# Patient Record
Sex: FEMALE | Race: BLACK OR AFRICAN AMERICAN | NOT HISPANIC OR LATINO | Employment: FULL TIME | URBAN - METROPOLITAN AREA
[De-identification: names, ages, dates, MRNs, and addresses within clinical notes are randomized per-mention and may not be internally consistent; named-entity substitution may affect disease eponyms.]

---

## 2018-08-21 ENCOUNTER — OFFICE VISIT (OUTPATIENT)
Dept: SPINE | Facility: CLINIC | Age: 33
End: 2018-08-21
Payer: COMMERCIAL

## 2018-08-21 VITALS
HEIGHT: 62 IN | SYSTOLIC BLOOD PRESSURE: 135 MMHG | HEART RATE: 88 BPM | DIASTOLIC BLOOD PRESSURE: 80 MMHG | BODY MASS INDEX: 38.17 KG/M2 | WEIGHT: 207.44 LBS

## 2018-08-21 DIAGNOSIS — M54.42 ACUTE LEFT-SIDED LOW BACK PAIN WITH LEFT-SIDED SCIATICA: Primary | ICD-10-CM

## 2018-08-21 PROCEDURE — 3008F BODY MASS INDEX DOCD: CPT | Mod: ,,, | Performed by: PHYSICAL MEDICINE & REHABILITATION

## 2018-08-21 PROCEDURE — 99204 OFFICE O/P NEW MOD 45 MIN: CPT | Mod: ,,, | Performed by: PHYSICAL MEDICINE & REHABILITATION

## 2018-08-21 RX ORDER — ETODOLAC 400 MG/1
400 TABLET, EXTENDED RELEASE ORAL DAILY
Qty: 21 TABLET | Refills: 1 | Status: SHIPPED | OUTPATIENT
Start: 2018-08-21 | End: 2020-06-24

## 2018-08-21 RX ORDER — GABAPENTIN 300 MG/1
300 CAPSULE ORAL NIGHTLY
Qty: 30 CAPSULE | Refills: 1 | Status: SHIPPED | OUTPATIENT
Start: 2018-08-21 | End: 2020-06-24

## 2018-08-21 RX ORDER — NAPROXEN SODIUM 220 MG
220 TABLET ORAL
COMMUNITY
End: 2018-08-21 | Stop reason: ALTCHOICE

## 2018-08-21 RX ORDER — FERROUS GLUCONATE 324(38)MG
324 TABLET ORAL
COMMUNITY

## 2018-08-21 NOTE — PROGRESS NOTES
SUBJECTIVE:    Patient ID: Shalini Stephen is a 32 y.o. female.    Chief Complaint: Back Pain (aching pan in mid and lower back pain moving to left leg )    This is a 32-year-old woman who sees Dr. Watson for her primary care.  She has no chronic major medical problems.  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  She has no history of back problems.  She presents with a 1-1/2 month history of primarily left-sided low back pain at the lumbosacral junction radiating into the left lateral hip and ending in the left posterior calf just below the knee.  She feels like her left knee is getting weak.  She saw Dr. watson who started her on steroids.  She took 3 pills but discontinued them because she said they made her heart race.  Since then she has been taking over-the-counter Aleve and feels that is helping some.  She denies any bowel or bladder dysfunction fever chills sweats or unexpected weight loss.  She has no cancer history.  Current pain level is 4/10 but gets to be 8/10 at times.  Pain has been the index score is about 5.4 overall.          History reviewed. No pertinent past medical history.  Social History     Socioeconomic History    Marital status: Single     Spouse name: Not on file    Number of children: Not on file    Years of education: Not on file    Highest education level: Not on file   Social Needs    Financial resource strain: Not on file    Food insecurity - worry: Not on file    Food insecurity - inability: Not on file    Transportation needs - medical: Not on file    Transportation needs - non-medical: Not on file   Occupational History    Not on file   Tobacco Use    Smoking status: Not on file   Substance and Sexual Activity    Alcohol use: Not on file    Drug use: Not on file    Sexual activity: Not on file   Other Topics Concern    Not on file   Social History Narrative    Not on file     History reviewed. No pertinent surgical history.  History reviewed. No pertinent  "family history.  Vitals:    08/21/18 1459   BP: 135/80   Pulse: 88   Weight: 94.1 kg (207 lb 7.3 oz)   Height: 5' 2" (1.575 m)       Review of Systems   Constitutional: Negative for chills, diaphoresis, fatigue, fever and unexpected weight change.   HENT: Negative for trouble swallowing.    Eyes: Negative for visual disturbance.   Respiratory: Negative for shortness of breath.    Cardiovascular: Negative for chest pain.   Gastrointestinal: Negative for abdominal pain, constipation, nausea and vomiting.   Genitourinary: Negative for difficulty urinating.   Musculoskeletal: Negative for arthralgias, back pain, gait problem, joint swelling, myalgias, neck pain and neck stiffness.   Neurological: Negative for dizziness, speech difficulty, weakness, light-headedness, numbness and headaches.          Objective:      Physical Exam   Constitutional: She is oriented to person, place, and time. She appears well-developed and well-nourished.   Neurological: She is alert and oriented to person, place, and time.   She is awake and in no acute distress  She has no point tenderness or palpable masses about the lumbar spine  Forward flexion is to 90° with no pain  Extension causes mild pain at the lumbosacral junction  Deep tendon reflexes +2 at both knees and +1 at both ankles  Strength is normal in both lower extremities  Straight leg raising reproduces left calf discomfort on the left and is normal in the right  IRAJ testing is negative for reproduction of back pain  X-rays of the lumbar spine show moderate degenerative changes at L4-5 and L5-S1 more so than would be expected for her age           Assessment:       1. Acute left-sided low back pain with left-sided sciatica           Plan:         she has a nonfocal examination from a neurological standpoint and no historical red flags.  She has back pain and leg pain most likely on the basis of degenerative disc disease in the lower lumbar spine.  We will try to treat her " conservatively with physical therapy.  I have discontinued the Aleve in favor of Lodine  mg daily.  Add Neurontin for the left leg pain.  Consider MRI and subsequent epidural steroid injections  Acute left-sided low back pain with left-sided sciatica  -     Ambulatory Referral to Physical/Occupational Therapy  -     etodolac (LODINE XL) 400 MG 24 hr tablet; Take 1 tablet (400 mg total) by mouth once daily.  Dispense: 21 tablet; Refill: 1  -     gabapentin (NEURONTIN) 300 MG capsule; Take 1 capsule (300 mg total) by mouth every evening.  Dispense: 30 capsule; Refill: 1

## 2018-09-10 ENCOUNTER — CLINICAL SUPPORT (OUTPATIENT)
Dept: REHABILITATION | Facility: HOSPITAL | Age: 33
End: 2018-09-10
Attending: PHYSICAL MEDICINE & REHABILITATION
Payer: COMMERCIAL

## 2018-09-10 DIAGNOSIS — M54.42 ACUTE LEFT-SIDED LOW BACK PAIN WITH LEFT-SIDED SCIATICA: Primary | ICD-10-CM

## 2018-09-10 PROBLEM — M54.50 LOW BACK PAIN: Status: ACTIVE | Noted: 2018-09-10

## 2018-09-10 PROCEDURE — 97110 THERAPEUTIC EXERCISES: CPT | Mod: PN

## 2018-09-10 PROCEDURE — 97161 PT EVAL LOW COMPLEX 20 MIN: CPT | Mod: PN

## 2018-09-10 PROCEDURE — 97010 HOT OR COLD PACKS THERAPY: CPT | Mod: PN

## 2018-09-17 ENCOUNTER — CLINICAL SUPPORT (OUTPATIENT)
Dept: REHABILITATION | Facility: HOSPITAL | Age: 33
End: 2018-09-17
Attending: PHYSICAL MEDICINE & REHABILITATION
Payer: COMMERCIAL

## 2018-09-17 DIAGNOSIS — M54.42 ACUTE LEFT-SIDED LOW BACK PAIN WITH LEFT-SIDED SCIATICA: Primary | ICD-10-CM

## 2018-09-17 PROCEDURE — 97010 HOT OR COLD PACKS THERAPY: CPT | Mod: PN

## 2018-09-17 PROCEDURE — 97110 THERAPEUTIC EXERCISES: CPT | Mod: PN

## 2018-09-17 NOTE — PROGRESS NOTES
Name: Shalini Stephen  [facility] Number: [id number]  Date of Treatment: 09/17/2018   Diagnosis:   Encounter Diagnosis   Name Primary?    Acute left-sided low back pain with left-sided sciatica Yes       Time in: 1305  Time Out: 1345  Total Treatment Time: 40  Group Time: 0      Subjective:    Shalini reports improvement of symptoms.  Patient reports their pain to be 4/10 on a 0-10 scale with 0 being no pain and 10 being the worst pain imaginable.    Objective    Patient received individual therapy to increase flexibility and pain-free movements  with activities as follows:     Shalini received therapeutic exercises to develop ROM and flexibility for 25 minutes including: pyriformis stretches, trunk flexion extension in supine and prone, SLR, posterior pelvic tilt, and knee to chest         The patient received the following direct contact modalities after being cleared for contraindications: Moist hot packs    The patient received the following supervised modalities after being cleared for contradictions: intermittent lumbar traction 45% to 25% load @60 sec intervals and hold.    Assessment:       Pt will continue to benefit from skilled PT intervention. Medical Necessity is demonstrated by:  Unable to participate fully in daily activities.    Patient is making good progress towards established goals.          Plan:  Continue with established Plan of Care towards PT goals.

## 2018-10-08 ENCOUNTER — CLINICAL SUPPORT (OUTPATIENT)
Dept: REHABILITATION | Facility: HOSPITAL | Age: 33
End: 2018-10-08
Attending: PHYSICAL MEDICINE & REHABILITATION
Payer: COMMERCIAL

## 2018-10-08 DIAGNOSIS — G89.29 CHRONIC BILATERAL LOW BACK PAIN WITH LEFT-SIDED SCIATICA: ICD-10-CM

## 2018-10-08 DIAGNOSIS — M54.42 CHRONIC BILATERAL LOW BACK PAIN WITH LEFT-SIDED SCIATICA: ICD-10-CM

## 2018-10-08 PROCEDURE — 97110 THERAPEUTIC EXERCISES: CPT | Mod: PN

## 2018-10-08 PROCEDURE — 97012 MECHANICAL TRACTION THERAPY: CPT | Mod: PN

## 2018-10-08 PROCEDURE — 97010 HOT OR COLD PACKS THERAPY: CPT | Mod: PN

## 2018-10-08 NOTE — PROGRESS NOTES
Name: Shalini Stephen  [facility] Number: [id number]  Date of Treatment: 10/08/2018   Diagnosis:   Encounter Diagnosis   Name Primary?    Chronic bilateral low back pain with left-sided sciatica        Time in: 1400  Time Out: 1505  Total Treatment Time: 65  Group Time: 0      Subjective:    Shalini reports decreased pain.  Patient reports their pain to be 4/10 on a 0-10 scale with 0 being no pain and 10 being the worst pain imaginable.    Objective    Patient received individual therapy to increase flexibility and core stabilization with activities as follows:     Shalini received therapeutic exercises to develop flexibility and core stabilization for 30 minutes including:   Hamstring stretch, pyriformis/gluts strtech  Posterior pelvic tilt, briidging  SLR  Trunk rotation  Abdominal sets  Trunk extension  Leg extension in prone    Shalini received the following manual therapy techniques: Manual traction were applied to the: lumbars for 15 minutes intermittent at 90#'s max and 45#'s min force.    The patient received the following supervised modalities after being cleared for contradictions: Moist hot pads to paralumbars x 15 min    Assessment:       Pt  reports referred pain to lower left leg from below the knee .  Tolerate traction and exercises well. No irradiating pain during session. Localized pain to L3 area on trunk extension.    Patient is making good progress towards established goals.    Plan:  Continue with established Plan of Care towards PT goals.

## 2018-11-06 ENCOUNTER — DOCUMENTATION ONLY (OUTPATIENT)
Dept: REHABILITATION | Facility: HOSPITAL | Age: 33
End: 2018-11-06

## 2018-11-06 NOTE — PROGRESS NOTES
REHAB SERVICES OUTPATIENT DISCHARGE SUMMARY  Physical Therapy      Name:  Shalini Stephen  Date:  11/6/18  Date of Evaluation:  9/10/18  Physician:  John Ahuja MD  Total # Of Visits:  3  Cancelled:  4  No Shows:  1  Diagnosis:  Acute left sided LBP with left sided sciatica    Physical/Functional Status:  At time of discharge, patient has localized pain to L3 4/10. Reports of referred pain to left lower leg but no irradiating pain during session.  The patient is to be discharged from our Therapy service for the following reason(s):  Patient has not attended therapy since 10/8/18    Degree of Goal Achievement:  Patient has partially met goals    Unplanned DC

## 2020-06-24 ENCOUNTER — LAB VISIT (OUTPATIENT)
Dept: LAB | Facility: HOSPITAL | Age: 35
End: 2020-06-24
Attending: FAMILY MEDICINE
Payer: COMMERCIAL

## 2020-06-24 DIAGNOSIS — Z00.00 ROUTINE GENERAL MEDICAL EXAMINATION AT A HEALTH CARE FACILITY: Primary | ICD-10-CM

## 2020-06-24 LAB
ALBUMIN SERPL BCP-MCNC: 4 G/DL (ref 3.5–5.2)
ALP SERPL-CCNC: 68 U/L (ref 55–135)
ALT SERPL W/O P-5'-P-CCNC: 17 U/L (ref 10–44)
ANION GAP SERPL CALC-SCNC: 8 MMOL/L (ref 8–16)
AST SERPL-CCNC: 16 U/L (ref 10–40)
BASOPHILS # BLD AUTO: 0.06 K/UL (ref 0–0.2)
BASOPHILS NFR BLD: 0.8 % (ref 0–1.9)
BILIRUB SERPL-MCNC: 0.5 MG/DL (ref 0.1–1)
BUN SERPL-MCNC: 14 MG/DL (ref 6–20)
CALCIUM SERPL-MCNC: 8.7 MG/DL (ref 8.7–10.5)
CHLORIDE SERPL-SCNC: 104 MMOL/L (ref 95–110)
CHOLEST SERPL-MCNC: 193 MG/DL (ref 120–199)
CHOLEST/HDLC SERPL: 3.1 {RATIO} (ref 2–5)
CO2 SERPL-SCNC: 26 MMOL/L (ref 23–29)
CREAT SERPL-MCNC: 0.7 MG/DL (ref 0.5–1.4)
DIFFERENTIAL METHOD: ABNORMAL
EOSINOPHIL # BLD AUTO: 0.2 K/UL (ref 0–0.5)
EOSINOPHIL NFR BLD: 2.5 % (ref 0–8)
ERYTHROCYTE [DISTWIDTH] IN BLOOD BY AUTOMATED COUNT: 13.4 % (ref 11.5–14.5)
EST. GFR  (AFRICAN AMERICAN): >60 ML/MIN/1.73 M^2
EST. GFR  (NON AFRICAN AMERICAN): >60 ML/MIN/1.73 M^2
GLUCOSE SERPL-MCNC: 86 MG/DL (ref 70–110)
HCT VFR BLD AUTO: 38.2 % (ref 37–48.5)
HDLC SERPL-MCNC: 63 MG/DL (ref 40–75)
HDLC SERPL: 32.6 % (ref 20–50)
HGB BLD-MCNC: 12.3 G/DL (ref 12–16)
IMM GRANULOCYTES # BLD AUTO: 0.03 K/UL (ref 0–0.04)
IMM GRANULOCYTES NFR BLD AUTO: 0.4 % (ref 0–0.5)
LDLC SERPL CALC-MCNC: 125.4 MG/DL (ref 63–159)
LYMPHOCYTES # BLD AUTO: 2.5 K/UL (ref 1–4.8)
LYMPHOCYTES NFR BLD: 33.4 % (ref 18–48)
MCH RBC QN AUTO: 28 PG (ref 27–31)
MCHC RBC AUTO-ENTMCNC: 32.2 G/DL (ref 32–36)
MCV RBC AUTO: 87 FL (ref 82–98)
MONOCYTES # BLD AUTO: 0.6 K/UL (ref 0.3–1)
MONOCYTES NFR BLD: 8.6 % (ref 4–15)
NEUTROPHILS # BLD AUTO: 4.1 K/UL (ref 1.8–7.7)
NEUTROPHILS NFR BLD: 54.3 % (ref 38–73)
NONHDLC SERPL-MCNC: 130 MG/DL
NRBC BLD-RTO: 0 /100 WBC
PLATELET # BLD AUTO: 391 K/UL (ref 150–350)
PMV BLD AUTO: 10.1 FL (ref 9.2–12.9)
POTASSIUM SERPL-SCNC: 4 MMOL/L (ref 3.5–5.1)
PROT SERPL-MCNC: 7.4 G/DL (ref 6–8.4)
RBC # BLD AUTO: 4.4 M/UL (ref 4–5.4)
SODIUM SERPL-SCNC: 138 MMOL/L (ref 136–145)
TRIGL SERPL-MCNC: 23 MG/DL (ref 30–150)
TSH SERPL DL<=0.005 MIU/L-ACNC: 1.18 UIU/ML (ref 0.34–5.6)
WBC # BLD AUTO: 7.48 K/UL (ref 3.9–12.7)

## 2020-06-24 PROCEDURE — 84443 ASSAY THYROID STIM HORMONE: CPT

## 2020-06-24 PROCEDURE — 36415 COLL VENOUS BLD VENIPUNCTURE: CPT

## 2020-06-24 PROCEDURE — 80053 COMPREHEN METABOLIC PANEL: CPT

## 2020-06-24 PROCEDURE — 85025 COMPLETE CBC W/AUTO DIFF WBC: CPT

## 2020-06-24 PROCEDURE — 80061 LIPID PANEL: CPT

## 2020-06-26 DIAGNOSIS — R10.11 ABDOMINAL PAIN, RIGHT UPPER QUADRANT: Primary | ICD-10-CM

## 2020-06-28 PROBLEM — K21.9 GASTROESOPHAGEAL REFLUX DISEASE: Status: ACTIVE | Noted: 2020-06-28

## 2020-06-28 PROBLEM — R10.11 RUQ ABDOMINAL PAIN: Status: ACTIVE | Noted: 2020-06-28

## 2020-06-30 ENCOUNTER — HOSPITAL ENCOUNTER (OUTPATIENT)
Dept: RADIOLOGY | Facility: HOSPITAL | Age: 35
Discharge: HOME | End: 2020-06-30
Attending: FAMILY MEDICINE
Payer: COMMERCIAL

## 2020-06-30 DIAGNOSIS — R10.11 ABDOMINAL PAIN, RIGHT UPPER QUADRANT: ICD-10-CM

## 2020-06-30 PROCEDURE — 76700 US EXAM ABDOM COMPLETE: CPT | Mod: TC

## 2020-07-14 ENCOUNTER — LAB VISIT (OUTPATIENT)
Dept: LAB | Facility: HOSPITAL | Age: 35
End: 2020-07-14
Attending: FAMILY MEDICINE
Payer: COMMERCIAL

## 2020-07-14 ENCOUNTER — HOSPITAL ENCOUNTER (OUTPATIENT)
Dept: RADIOLOGY | Facility: HOSPITAL | Age: 35
Discharge: HOME | End: 2020-07-14
Attending: FAMILY MEDICINE
Payer: COMMERCIAL

## 2020-07-14 ENCOUNTER — CLINICAL SUPPORT (OUTPATIENT)
Dept: CARDIOLOGY | Facility: HOSPITAL | Age: 35
End: 2020-07-14
Attending: FAMILY MEDICINE
Payer: COMMERCIAL

## 2020-07-14 DIAGNOSIS — M79.661 BILATERAL CALF PAIN: ICD-10-CM

## 2020-07-14 DIAGNOSIS — M79.661 TENDERNESS OF RIGHT CALF: ICD-10-CM

## 2020-07-14 DIAGNOSIS — M79.661 TENDERNESS OF RIGHT CALF: Primary | ICD-10-CM

## 2020-07-14 DIAGNOSIS — M79.662 BILATERAL CALF PAIN: ICD-10-CM

## 2020-07-14 DIAGNOSIS — R00.2 PALPITATIONS: Primary | ICD-10-CM

## 2020-07-14 LAB
BNP SERPL-MCNC: 51 PG/ML (ref 0–99)
D DIMER PPP IA.FEU-MCNC: 0.53 UG/ML FEU

## 2020-07-14 PROCEDURE — 93971 EXTREMITY STUDY: CPT | Mod: TC,RT

## 2020-07-14 PROCEDURE — 85379 FIBRIN DEGRADATION QUANT: CPT

## 2020-07-14 PROCEDURE — 36415 COLL VENOUS BLD VENIPUNCTURE: CPT

## 2020-07-14 PROCEDURE — 83880 ASSAY OF NATRIURETIC PEPTIDE: CPT

## 2020-07-14 PROCEDURE — 93226 XTRNL ECG REC<48 HR SCAN A/R: CPT

## 2020-07-31 ENCOUNTER — HOSPITAL ENCOUNTER (OUTPATIENT)
Dept: RADIOLOGY | Facility: HOSPITAL | Age: 35
Discharge: HOME | End: 2020-07-31
Attending: FAMILY MEDICINE
Payer: COMMERCIAL

## 2020-07-31 PROCEDURE — 71046 X-RAY EXAM CHEST 2 VIEWS: CPT | Mod: TC

## 2020-08-02 PROBLEM — F41.9 ANXIETY: Status: ACTIVE | Noted: 2020-08-02

## 2020-09-22 ENCOUNTER — OFFICE VISIT (OUTPATIENT)
Dept: FAMILY MEDICINE | Facility: CLINIC | Age: 35
End: 2020-09-22
Payer: COMMERCIAL

## 2020-09-22 VITALS
OXYGEN SATURATION: 99 % | HEIGHT: 64 IN | SYSTOLIC BLOOD PRESSURE: 120 MMHG | HEART RATE: 80 BPM | BODY MASS INDEX: 37.39 KG/M2 | DIASTOLIC BLOOD PRESSURE: 76 MMHG | TEMPERATURE: 99 F | WEIGHT: 219 LBS

## 2020-09-22 DIAGNOSIS — R14.0 ABDOMINAL BLOATING: Primary | ICD-10-CM

## 2020-09-22 PROCEDURE — 99213 PR OFFICE/OUTPT VISIT, EST, LEVL III, 20-29 MIN: ICD-10-PCS | Mod: S$GLB,,, | Performed by: FAMILY MEDICINE

## 2020-09-22 PROCEDURE — 3008F BODY MASS INDEX DOCD: CPT | Mod: CPTII,S$GLB,, | Performed by: FAMILY MEDICINE

## 2020-09-22 PROCEDURE — 99213 OFFICE O/P EST LOW 20 MIN: CPT | Mod: S$GLB,,, | Performed by: FAMILY MEDICINE

## 2020-09-22 PROCEDURE — 3008F PR BODY MASS INDEX (BMI) DOCUMENTED: ICD-10-PCS | Mod: CPTII,S$GLB,, | Performed by: FAMILY MEDICINE

## 2020-09-23 ENCOUNTER — LAB VISIT (OUTPATIENT)
Dept: LAB | Facility: HOSPITAL | Age: 35
End: 2020-09-23
Attending: FAMILY MEDICINE
Payer: COMMERCIAL

## 2020-09-23 DIAGNOSIS — R14.0 GASTRIC TYMPANY: Primary | ICD-10-CM

## 2020-09-23 PROCEDURE — 87338 HPYLORI STOOL AG IA: CPT

## 2020-09-23 NOTE — PROGRESS NOTES
Here with her .  He is positive for H problem arises starting therapy.  She had some acid reflux symptoms to Protonix for 90 days.  It is doing better.  She is a nonsmoker.  Feeling a little abdominal bloating.  Physical examination vital signs are noted.  Chest clear to auscultation heart rhythm murmur gallop or abdomen sounds are positive soft is a very slight upper quadrant sensitivity.    Impression abdominal bloating.  Gastroesophageal reflux disease.bmi 37    Plan H pylori stool antigen.  Diet weight reduction.

## 2020-09-30 ENCOUNTER — PATIENT MESSAGE (OUTPATIENT)
Dept: FAMILY MEDICINE | Facility: CLINIC | Age: 35
End: 2020-09-30

## 2020-10-01 LAB — H PYLORI AG STL QL IA: NEGATIVE

## 2020-11-12 ENCOUNTER — PATIENT OUTREACH (OUTPATIENT)
Dept: ADMINISTRATIVE | Facility: HOSPITAL | Age: 35
End: 2020-11-12

## 2020-11-13 NOTE — PROGRESS NOTES
Immunizations reviewed. [facility] reviewed. Care Everywhere reviewed.  [facility], [facility], and [facility] reviewed w/ no applicable results yielded.  Chart review completed.               JEFFREY

## 2021-01-04 ENCOUNTER — PATIENT MESSAGE (OUTPATIENT)
Dept: ADMINISTRATIVE | Facility: HOSPITAL | Age: 36
End: 2021-01-04

## 2021-04-05 ENCOUNTER — PATIENT MESSAGE (OUTPATIENT)
Dept: ADMINISTRATIVE | Facility: HOSPITAL | Age: 36
End: 2021-04-05

## 2021-07-07 ENCOUNTER — PATIENT MESSAGE (OUTPATIENT)
Dept: ADMINISTRATIVE | Facility: HOSPITAL | Age: 36
End: 2021-07-07

## 2021-09-20 ENCOUNTER — PATIENT OUTREACH (OUTPATIENT)
Dept: ADMINISTRATIVE | Facility: HOSPITAL | Age: 36
End: 2021-09-20

## 2021-10-05 ENCOUNTER — PATIENT MESSAGE (OUTPATIENT)
Dept: ADMINISTRATIVE | Facility: HOSPITAL | Age: 36
End: 2021-10-05

## 2022-01-03 ENCOUNTER — OFFICE VISIT (OUTPATIENT)
Dept: FAMILY MEDICINE | Facility: CLINIC | Age: 37
End: 2022-01-03
Payer: COMMERCIAL

## 2022-01-03 ENCOUNTER — LAB VISIT (OUTPATIENT)
Dept: LAB | Facility: HOSPITAL | Age: 37
End: 2022-01-03
Attending: FAMILY MEDICINE
Payer: COMMERCIAL

## 2022-01-03 VITALS
DIASTOLIC BLOOD PRESSURE: 71 MMHG | SYSTOLIC BLOOD PRESSURE: 117 MMHG | OXYGEN SATURATION: 100 % | BODY MASS INDEX: 36.19 KG/M2 | HEIGHT: 64 IN | HEART RATE: 74 BPM | WEIGHT: 212 LBS | TEMPERATURE: 98 F

## 2022-01-03 DIAGNOSIS — F41.9 ANXIETY: ICD-10-CM

## 2022-01-03 DIAGNOSIS — M54.2 NECK PAIN ON RIGHT SIDE: ICD-10-CM

## 2022-01-03 DIAGNOSIS — G43.909 MIGRAINE WITHOUT STATUS MIGRAINOSUS, NOT INTRACTABLE, UNSPECIFIED MIGRAINE TYPE: ICD-10-CM

## 2022-01-03 DIAGNOSIS — R22.1 NECK SWELLING: ICD-10-CM

## 2022-01-03 DIAGNOSIS — G43.909 MIGRAINE WITHOUT STATUS MIGRAINOSUS, NOT INTRACTABLE, UNSPECIFIED MIGRAINE TYPE: Primary | ICD-10-CM

## 2022-01-03 LAB
ALBUMIN SERPL BCP-MCNC: 3.9 G/DL (ref 3.5–5.2)
ALP SERPL-CCNC: 60 U/L (ref 55–135)
ALT SERPL W/O P-5'-P-CCNC: 21 U/L (ref 10–44)
ANION GAP SERPL CALC-SCNC: 10 MMOL/L (ref 8–16)
AST SERPL-CCNC: 17 U/L (ref 10–40)
BASOPHILS # BLD AUTO: 0.04 K/UL (ref 0–0.2)
BASOPHILS NFR BLD: 0.4 % (ref 0–1.9)
BILIRUB SERPL-MCNC: 0.8 MG/DL (ref 0.1–1)
BUN SERPL-MCNC: 9 MG/DL (ref 6–20)
CALCIUM SERPL-MCNC: 8.5 MG/DL (ref 8.7–10.5)
CHLORIDE SERPL-SCNC: 103 MMOL/L (ref 95–110)
CO2 SERPL-SCNC: 24 MMOL/L (ref 23–29)
CREAT SERPL-MCNC: 0.6 MG/DL (ref 0.5–1.4)
CRP SERPL-MCNC: 0.26 MG/DL
DIFFERENTIAL METHOD: ABNORMAL
EOSINOPHIL # BLD AUTO: 0.2 K/UL (ref 0–0.5)
EOSINOPHIL NFR BLD: 1.7 % (ref 0–8)
ERYTHROCYTE [DISTWIDTH] IN BLOOD BY AUTOMATED COUNT: 14.7 % (ref 11.5–14.5)
ERYTHROCYTE [SEDIMENTATION RATE] IN BLOOD BY WESTERGREN METHOD: 5 MM/HR (ref 0–20)
EST. GFR  (AFRICAN AMERICAN): >60 ML/MIN/1.73 M^2
EST. GFR  (NON AFRICAN AMERICAN): >60 ML/MIN/1.73 M^2
GLUCOSE SERPL-MCNC: 84 MG/DL (ref 70–110)
HCT VFR BLD AUTO: 36.8 % (ref 37–48.5)
HGB BLD-MCNC: 12 G/DL (ref 12–16)
IMM GRANULOCYTES # BLD AUTO: 0.02 K/UL (ref 0–0.04)
IMM GRANULOCYTES NFR BLD AUTO: 0.2 % (ref 0–0.5)
LYMPHOCYTES # BLD AUTO: 2.3 K/UL (ref 1–4.8)
LYMPHOCYTES NFR BLD: 25.4 % (ref 18–48)
MCH RBC QN AUTO: 27.5 PG (ref 27–31)
MCHC RBC AUTO-ENTMCNC: 32.6 G/DL (ref 32–36)
MCV RBC AUTO: 84 FL (ref 82–98)
MONOCYTES # BLD AUTO: 0.6 K/UL (ref 0.3–1)
MONOCYTES NFR BLD: 6.9 % (ref 4–15)
NEUTROPHILS # BLD AUTO: 5.9 K/UL (ref 1.8–7.7)
NEUTROPHILS NFR BLD: 65.4 % (ref 38–73)
NRBC BLD-RTO: 0 /100 WBC
PLATELET # BLD AUTO: 360 K/UL (ref 150–450)
PMV BLD AUTO: 10 FL (ref 9.2–12.9)
POTASSIUM SERPL-SCNC: 3.8 MMOL/L (ref 3.5–5.1)
PROT SERPL-MCNC: 7.3 G/DL (ref 6–8.4)
RBC # BLD AUTO: 4.37 M/UL (ref 4–5.4)
SODIUM SERPL-SCNC: 137 MMOL/L (ref 136–145)
TSH SERPL DL<=0.005 MIU/L-ACNC: 1.44 UIU/ML (ref 0.34–5.6)
WBC # BLD AUTO: 9.01 K/UL (ref 3.9–12.7)

## 2022-01-03 PROCEDURE — 3074F PR MOST RECENT SYSTOLIC BLOOD PRESSURE < 130 MM HG: ICD-10-PCS | Mod: CPTII,S$GLB,, | Performed by: FAMILY MEDICINE

## 2022-01-03 PROCEDURE — 84443 ASSAY THYROID STIM HORMONE: CPT | Performed by: FAMILY MEDICINE

## 2022-01-03 PROCEDURE — 85025 COMPLETE CBC W/AUTO DIFF WBC: CPT | Performed by: FAMILY MEDICINE

## 2022-01-03 PROCEDURE — 3074F SYST BP LT 130 MM HG: CPT | Mod: CPTII,S$GLB,, | Performed by: FAMILY MEDICINE

## 2022-01-03 PROCEDURE — 99214 PR OFFICE/OUTPT VISIT, EST, LEVL IV, 30-39 MIN: ICD-10-PCS | Mod: S$GLB,,, | Performed by: FAMILY MEDICINE

## 2022-01-03 PROCEDURE — 3008F PR BODY MASS INDEX (BMI) DOCUMENTED: ICD-10-PCS | Mod: CPTII,S$GLB,, | Performed by: FAMILY MEDICINE

## 2022-01-03 PROCEDURE — 3008F BODY MASS INDEX DOCD: CPT | Mod: CPTII,S$GLB,, | Performed by: FAMILY MEDICINE

## 2022-01-03 PROCEDURE — 3078F DIAST BP <80 MM HG: CPT | Mod: CPTII,S$GLB,, | Performed by: FAMILY MEDICINE

## 2022-01-03 PROCEDURE — 85651 RBC SED RATE NONAUTOMATED: CPT | Performed by: FAMILY MEDICINE

## 2022-01-03 PROCEDURE — 1159F MED LIST DOCD IN RCRD: CPT | Mod: CPTII,S$GLB,, | Performed by: FAMILY MEDICINE

## 2022-01-03 PROCEDURE — 1159F PR MEDICATION LIST DOCUMENTED IN MEDICAL RECORD: ICD-10-PCS | Mod: CPTII,S$GLB,, | Performed by: FAMILY MEDICINE

## 2022-01-03 PROCEDURE — 86140 C-REACTIVE PROTEIN: CPT | Performed by: FAMILY MEDICINE

## 2022-01-03 PROCEDURE — 86038 ANTINUCLEAR ANTIBODIES: CPT | Performed by: FAMILY MEDICINE

## 2022-01-03 PROCEDURE — 36415 COLL VENOUS BLD VENIPUNCTURE: CPT | Performed by: FAMILY MEDICINE

## 2022-01-03 PROCEDURE — 80053 COMPREHEN METABOLIC PANEL: CPT | Performed by: FAMILY MEDICINE

## 2022-01-03 PROCEDURE — 99214 OFFICE O/P EST MOD 30 MIN: CPT | Mod: S$GLB,,, | Performed by: FAMILY MEDICINE

## 2022-01-03 PROCEDURE — 3078F PR MOST RECENT DIASTOLIC BLOOD PRESSURE < 80 MM HG: ICD-10-PCS | Mod: CPTII,S$GLB,, | Performed by: FAMILY MEDICINE

## 2022-01-03 RX ORDER — SUMATRIPTAN SUCCINATE 100 MG/1
100 TABLET ORAL
COMMUNITY
End: 2022-01-03 | Stop reason: SDUPTHER

## 2022-01-03 RX ORDER — ALPRAZOLAM 0.5 MG/1
0.5 TABLET ORAL DAILY PRN
Qty: 30 TABLET | Refills: 0 | Status: SHIPPED | OUTPATIENT
Start: 2022-01-03 | End: 2023-05-22 | Stop reason: SDUPTHER

## 2022-01-03 RX ORDER — SUMATRIPTAN SUCCINATE 100 MG/1
TABLET ORAL
Qty: 9 TABLET | Refills: 3 | Status: SHIPPED | OUTPATIENT
Start: 2022-01-03 | End: 2023-05-22 | Stop reason: SDUPTHER

## 2022-01-04 LAB — ANA TITR SER IF: NEGATIVE {TITER}

## 2022-01-04 NOTE — PROGRESS NOTES
Migraine headaches.  Right temporal and parietal.  No nausea or vomiting.  No scotoma.  Gets 1 about every 3 days for month or so.  Over-the-counter Excedrin.  They were only with her cycle previously.  No hormone changes.  Similar headaches just change in frequency.  Also some right neck pain.  Swelling she thinks at the lower neck.  Movement is okay.  Family history of migraines and aunt and mother.  Neck is just uncomfortable not actually tender.  That is been going on for few months.  She feels vein may BM little enlarged in the right lateral lower neck.  Also anxiety issues.  Needs refill of Xanax.    Physical examination vital signs noted.  Pupils are equal round regular active light accommodation temple nontender.  Parietal scalp nontender.  Extraocular muscles are intact.  Cranial nerves intact.  Romberg negative.  Deep tender reflexes 2+ upper and lower extremities.  Neck is supple good range of motion without pain.  I really do not feel any fullness or pain or see any abnormality in the right lower neck.  Chest clear to auscultation.  Heart regular rate rhythm.  Extremities without edema.    Impression.  Migraine headaches.  Right neck pain.  Anxiety.    Plan Xanax 0.5 number 30 pills 1 daily maximum p.r.n..  X-ray cervical spine.  Will ultrasound the neck soft tissue see if we see anything there.  Sed rate CRP YOSI TSH CBC CMP ordered.  Imitrex 100 mg 9. With 2 refills.  One at onset of by Yuly headache and q.day maximum.  Further workup including MRI depending on response to medication and results.

## 2022-01-07 ENCOUNTER — TELEPHONE (OUTPATIENT)
Dept: FAMILY MEDICINE | Facility: CLINIC | Age: 37
End: 2022-01-07
Payer: COMMERCIAL

## 2022-01-07 NOTE — TELEPHONE ENCOUNTER
----- Message from Pernell Schneider sent at 1/7/2022 10:23 AM CST -----  Contact: pt  Type: Needs Medical Advice    Who Called:pt  Best Call Back Number:[phone]    Additional Information: Requesting callback regarding missed call please call back     Please Advise-Thank you

## 2022-01-08 PROBLEM — G43.909 MIGRAINE WITHOUT STATUS MIGRAINOSUS, NOT INTRACTABLE: Status: ACTIVE | Noted: 2022-01-08

## 2022-01-08 PROBLEM — R10.11 RUQ ABDOMINAL PAIN: Status: RESOLVED | Noted: 2020-06-28 | Resolved: 2022-01-08

## 2022-01-08 PROBLEM — M54.42 ACUTE LEFT-SIDED LOW BACK PAIN WITH LEFT-SIDED SCIATICA: Status: RESOLVED | Noted: 2018-08-21 | Resolved: 2022-01-08

## 2022-01-10 NOTE — TELEPHONE ENCOUNTER
Left message for patient that someone likely called with lab results.  Per MD: Lab is normal.  Let us know how the medication works.  NORMAL followup as needed.

## 2022-02-28 ENCOUNTER — HOSPITAL ENCOUNTER (OUTPATIENT)
Dept: RADIOLOGY | Facility: HOSPITAL | Age: 37
Discharge: HOME | End: 2022-02-28
Attending: FAMILY MEDICINE
Payer: COMMERCIAL

## 2022-02-28 DIAGNOSIS — M54.2 NECK PAIN ON RIGHT SIDE: ICD-10-CM

## 2022-02-28 DIAGNOSIS — R22.1 NECK SWELLING: ICD-10-CM

## 2022-02-28 PROCEDURE — 76536 US EXAM OF HEAD AND NECK: CPT | Mod: TC

## 2022-02-28 PROCEDURE — 72040 X-RAY EXAM NECK SPINE 2-3 VW: CPT | Mod: 26,,, | Performed by: RADIOLOGY

## 2022-02-28 PROCEDURE — 72040 X-RAY EXAM NECK SPINE 2-3 VW: CPT | Mod: TC,FY

## 2022-02-28 PROCEDURE — 76536 US EXAM OF HEAD AND NECK: CPT | Mod: 26,,, | Performed by: RADIOLOGY

## 2022-02-28 PROCEDURE — 76536 US SOFT TISSUE HEAD NECK THYROID: ICD-10-PCS | Mod: 26,,, | Performed by: RADIOLOGY

## 2022-02-28 PROCEDURE — 72040 XR CERVICAL SPINE 2 OR 3 VIEWS: ICD-10-PCS | Mod: 26,,, | Performed by: RADIOLOGY

## 2022-03-28 ENCOUNTER — TELEPHONE (OUTPATIENT)
Dept: FAMILY MEDICINE | Facility: CLINIC | Age: 37
End: 2022-03-28
Payer: COMMERCIAL

## 2022-03-28 DIAGNOSIS — Z12.31 SCREENING MAMMOGRAM FOR BREAST CANCER: Primary | ICD-10-CM

## 2022-03-28 NOTE — TELEPHONE ENCOUNTER
----- Message from Morgan Collins sent at 3/28/2022  1:50 PM CDT -----  Contact: Self  Type:  Mammogram    Caller is requesting to schedule their annual mammogram appointment.  Order is not listed in EPIC.  Please enter order and contact patient to schedule.    Name of Caller:  Patient  Where would they like the mammogram performed?  [facility]  Best Call Back Number:  [phone]   Additional Information:

## 2022-03-30 ENCOUNTER — PATIENT OUTREACH (OUTPATIENT)
Dept: ADMINISTRATIVE | Facility: OTHER | Age: 37
End: 2022-03-30
Payer: COMMERCIAL

## 2022-03-30 NOTE — PROGRESS NOTES
Health Maintenance Due   Topic Date Due    Hepatitis C Screening  Never done    Cervical Cancer Screening  Never done    HIV Screening  Never done    TETANUS VACCINE  Never done    Influenza Vaccine (1) 09/01/2021    COVID-19 Vaccine (2 - Booster for Ilsa series) 01/01/2022     Updates were requested from care everywhere.  Chart was reviewed for overdue Proactive [facility] Encounters (SENDY) topics (CRS, Breast Cancer Screening, Eye exam)  Health Maintenance has been updated.  LINKS immunization registry triggered.  Immunizations were reconciled.

## 2022-04-01 ENCOUNTER — HOSPITAL ENCOUNTER (OUTPATIENT)
Dept: RADIOLOGY | Facility: CLINIC | Age: 37
Discharge: HOME | End: 2022-04-01
Attending: FAMILY MEDICINE
Payer: COMMERCIAL

## 2022-04-01 VITALS — HEIGHT: 64 IN | WEIGHT: 212.06 LBS | BODY MASS INDEX: 36.2 KG/M2

## 2022-04-01 DIAGNOSIS — Z12.31 SCREENING MAMMOGRAM FOR BREAST CANCER: ICD-10-CM

## 2022-04-01 PROCEDURE — 77063 BREAST TOMOSYNTHESIS BI: CPT | Mod: 26,,, | Performed by: RADIOLOGY

## 2022-04-01 PROCEDURE — 77067 SCR MAMMO BI INCL CAD: CPT | Mod: 26,,, | Performed by: RADIOLOGY

## 2022-04-01 PROCEDURE — 77063 MAMMO DIGITAL SCREENING BILAT WITH TOMO: ICD-10-PCS | Mod: 26,,, | Performed by: RADIOLOGY

## 2022-04-01 PROCEDURE — 77063 BREAST TOMOSYNTHESIS BI: CPT | Mod: TC,PO

## 2022-04-01 PROCEDURE — 77067 MAMMO DIGITAL SCREENING BILAT WITH TOMO: ICD-10-PCS | Mod: 26,,, | Performed by: RADIOLOGY

## 2022-06-01 ENCOUNTER — PATIENT MESSAGE (OUTPATIENT)
Dept: ADMINISTRATIVE | Facility: HOSPITAL | Age: 37
End: 2022-06-01
Payer: COMMERCIAL

## 2022-08-24 ENCOUNTER — PATIENT MESSAGE (OUTPATIENT)
Dept: ADMINISTRATIVE | Facility: HOSPITAL | Age: 37
End: 2022-08-24
Payer: COMMERCIAL

## 2022-10-11 ENCOUNTER — PATIENT MESSAGE (OUTPATIENT)
Dept: ADMINISTRATIVE | Facility: HOSPITAL | Age: 37
End: 2022-10-11
Payer: COMMERCIAL

## 2022-11-30 ENCOUNTER — TELEPHONE (OUTPATIENT)
Dept: FAMILY MEDICINE | Facility: CLINIC | Age: 37
End: 2022-11-30
Payer: COMMERCIAL

## 2022-11-30 NOTE — LETTER
November 30, 2022      Dr. Mathis - [facility]  [address], [address] [address]  Phone: [phone]  Fax: [phone]       Patient: Shalini Stephen   YOB: 1985    To Whom It May Concern:    Silvina Stephen may return to work 12/05/2022 without restrictions. If you have any questions or concerns, or if I can be of further assistance, please do not hesitate to contact me.    Sincerely,      Ge daniel M.D.

## 2022-12-03 ENCOUNTER — NURSE TRIAGE (OUTPATIENT)
Dept: ADMINISTRATIVE | Facility: CLINIC | Age: 37
End: 2022-12-03
Payer: COMMERCIAL

## 2022-12-03 NOTE — TELEPHONE ENCOUNTER
Pt covid positive on Sunday. Now c/o cough and SOB at rest. Care advised to go to ED now per protocol. Verbalized understanding. Encounter routed to provider.     Reason for Disposition   MODERATE difficulty breathing (e.g., speaks in phrases, SOB even at rest, pulse 100-120)    Additional Information   Negative: SEVERE difficulty breathing (e.g., struggling for each breath, speaks in single words)   Negative: Difficult to awaken or acting confused (e.g., disoriented, slurred speech)   Negative: Bluish (or gray) lips or face now   Negative: Shock suspected (e.g., cold/pale/clammy skin, too weak to stand, low BP, rapid pulse)   Negative: Sounds like a life-threatening emergency to the triager   Negative: SEVERE or constant chest pain or pressure  (Exception: Mild central chest pain, present only when coughing.)    Protocols used: Coronavirus (COVID-19) Diagnosed or [id number]

## 2022-12-04 ENCOUNTER — HOSPITAL ENCOUNTER (EMERGENCY)
Facility: HOSPITAL | Age: 37
Discharge: HOME | End: 2022-12-04
Attending: EMERGENCY MEDICINE
Payer: COMMERCIAL

## 2022-12-04 VITALS
WEIGHT: 220 LBS | TEMPERATURE: 99 F | HEART RATE: 72 BPM | OXYGEN SATURATION: 100 % | SYSTOLIC BLOOD PRESSURE: 113 MMHG | BODY MASS INDEX: 37.76 KG/M2 | DIASTOLIC BLOOD PRESSURE: 70 MMHG | RESPIRATION RATE: 18 BRPM

## 2022-12-04 DIAGNOSIS — J45.21 MILD INTERMITTENT ASTHMA WITH ACUTE EXACERBATION: ICD-10-CM

## 2022-12-04 DIAGNOSIS — J98.01 BRONCHOSPASM: ICD-10-CM

## 2022-12-04 DIAGNOSIS — U07.1 COVID-19 VIRUS INFECTION: Primary | ICD-10-CM

## 2022-12-04 LAB
B-HCG UR QL: NEGATIVE
CTP QC/QA: YES

## 2022-12-04 PROCEDURE — 93005 ELECTROCARDIOGRAM TRACING: CPT | Performed by: INTERNAL MEDICINE

## 2022-12-04 PROCEDURE — 81025 URINE PREGNANCY TEST: CPT | Performed by: EMERGENCY MEDICINE

## 2022-12-04 PROCEDURE — 94799 UNLISTED PULMONARY SVC/PX: CPT

## 2022-12-04 PROCEDURE — 94640 AIRWAY INHALATION TREATMENT: CPT

## 2022-12-04 PROCEDURE — 99284 EMERGENCY DEPT VISIT MOD MDM: CPT | Mod: 25

## 2022-12-04 PROCEDURE — 25000242 PHARM REV CODE 250 ALT 637 W/ HCPCS: Performed by: EMERGENCY MEDICINE

## 2022-12-04 PROCEDURE — 99900035 HC TECH TIME PER 15 MIN (STAT)

## 2022-12-04 PROCEDURE — 93010 ELECTROCARDIOGRAM REPORT: CPT | Mod: ,,, | Performed by: INTERNAL MEDICINE

## 2022-12-04 PROCEDURE — 99900031 HC PATIENT EDUCATION (STAT)

## 2022-12-04 PROCEDURE — 93010 EKG 12-LEAD: ICD-10-PCS | Mod: ,,, | Performed by: INTERNAL MEDICINE

## 2022-12-04 RX ORDER — PROMETHAZINE HYDROCHLORIDE AND DEXTROMETHORPHAN HYDROBROMIDE 6.25; 15 MG/5ML; MG/5ML
5 SYRUP ORAL EVERY 6 HOURS PRN
COMMUNITY
Start: 2022-11-26 | End: 2023-04-05

## 2022-12-04 RX ORDER — PREDNISONE 20 MG/1
20 TABLET ORAL DAILY
Qty: 5 TABLET | Refills: 0 | Status: SHIPPED | OUTPATIENT
Start: 2022-12-04 | End: 2022-12-09

## 2022-12-04 RX ORDER — ALBUTEROL SULFATE 90 UG/1
AEROSOL, METERED RESPIRATORY (INHALATION)
COMMUNITY
Start: 2022-12-04 | End: 2023-06-20

## 2022-12-04 RX ORDER — ALBUTEROL SULFATE 90 UG/1
1-2 AEROSOL, METERED RESPIRATORY (INHALATION) EVERY 6 HOURS PRN
Qty: 8 G | Refills: 0 | Status: SHIPPED | OUTPATIENT
Start: 2022-12-04 | End: 2023-12-04

## 2022-12-04 RX ORDER — BUDESONIDE 0.5 MG/2ML
0.5 INHALANT ORAL DAILY
Qty: 20 ML | Refills: 0 | Status: SHIPPED | OUTPATIENT
Start: 2022-12-04 | End: 2023-06-22 | Stop reason: SDUPTHER

## 2022-12-04 RX ORDER — ALBUTEROL SULFATE 2.5 MG/.5ML
2.5 SOLUTION RESPIRATORY (INHALATION) EVERY 4 HOURS PRN
Qty: 30 EACH | Refills: 1 | Status: SHIPPED | OUTPATIENT
Start: 2022-12-04 | End: 2024-04-03

## 2022-12-04 RX ORDER — LEVALBUTEROL INHALATION SOLUTION 1.25 MG/3ML
1.25 SOLUTION RESPIRATORY (INHALATION)
Status: COMPLETED | OUTPATIENT
Start: 2022-12-04 | End: 2022-12-04

## 2022-12-04 RX ADMIN — LEVALBUTEROL HYDROCHLORIDE 1.25 MG: 1.25 SOLUTION RESPIRATORY (INHALATION) at 02:12

## 2022-12-04 NOTE — ED TRIAGE NOTES
"Pt c/ presistent chest "discomfort'  and SOA s/p dx covid 1 week ago; denies any new fevers/chills/n/v/d; vss  "

## 2022-12-04 NOTE — Clinical Note
"Shalini Alarconrohini Stephen was seen and treated in our emergency department on 12/4/2022.  She may return to work on 12/06/2022.       If you have any questions or concerns, please don't hesitate to call.      RONAL ONTIVEROS RN    "

## 2022-12-04 NOTE — ED NOTES
"REPORTS SOB PTA. MONITORING INITIATED POST ASSESSMENT. FAMILY AT BS. AIRBORNE AND DROPLET ISOLATION AT ROOM ARRIVAL. MASK IN PLACE.  PRIVATE ROOM. EVEN AND NON LABORED RESPIRATIONS.  AIRWAY CLEAR.  PULSES REGULAR.  < 3" CAPILLARY REFILL. SKIN WDI.  MAEW.  NON DISTENDED ABDOMEN. ALERT, ORIENTED AND AMBULATORY. NAD AT THIS TIME.  CALL LIGHT IN REACH.   "

## 2022-12-04 NOTE — ED PROVIDER NOTES
Encounter Date: 12/4/2022       History     Chief Complaint   Patient presents with    Shortness of Breath    Chest Pain     37-year-old female with history of asthma, migraine headaches.  Patient recently diagnosed with COVID proximally 1 week ago.  She presents to the emergency department with complaint of persistent cough, chest tightness since last week.  Patient states that she was diagnosed with COVID proximally for 5 days ago.  At which time she still had difficulty getting air in and out of lungs.  She denies pleuritic chest pain, no nausea, vomiting, has had low-grade fever.  Denies any other constitutional symptoms.    Review of patient's allergies indicates:   Allergen Reactions    Iodine and iodide containing products     Shellfish containing products     Vicodin [hydrocodone-acetaminophen] Nausea And Vomiting     Past Medical History:   Diagnosis Date    Allergy     Asthma      No past surgical history on file.  No family history on file.  Social History     Tobacco Use    Smoking status: Never    Smokeless tobacco: Never   Substance Use Topics    Alcohol use: Yes    Drug use: Not Currently     Review of Systems   Constitutional:  Negative for fever.   HENT:  Negative for sore throat.    Respiratory:  Positive for chest tightness, shortness of breath and wheezing.    Cardiovascular:  Negative for chest pain and palpitations.   Gastrointestinal:  Negative for abdominal pain, nausea and vomiting.   Genitourinary:  Negative for dysuria.   Musculoskeletal:  Negative for back pain.   Skin:  Negative for rash.   Neurological:  Negative for weakness.   Hematological:  Does not bruise/bleed easily.     Physical Exam     Initial Vitals [12/04/22 1109]   BP Pulse Resp Temp SpO2   126/86 97 20 97.8 °F (36.6 °C) 100 %      MAP       --         Physical Exam    Nursing note and vitals reviewed.  Constitutional: She appears well-developed and well-nourished.   HENT:   Head: Normocephalic and atraumatic.   Nose: Nose  normal.   Mouth/Throat: Oropharynx is clear and moist.   Eyes: Conjunctivae and EOM are normal. Pupils are equal, round, and reactive to light.   Neck: Neck supple. No thyromegaly present. No tracheal deviation present.   Normal range of motion.  Cardiovascular:  Normal rate, regular rhythm, normal heart sounds and intact distal pulses.     Exam reveals no gallop and no friction rub.       No murmur heard.  Pulmonary/Chest: No stridor. No respiratory distress. She has no rhonchi. She exhibits no tenderness.   Course bilateral breath sounds no adventitious sounds, with prolonged expiratory phase.   Abdominal: Abdomen is soft. Bowel sounds are normal. She exhibits no mass. There is no rebound and no guarding.   Musculoskeletal:         General: No edema. Normal range of motion.      Cervical back: Normal range of motion and neck supple.     Lymphadenopathy:     She has no cervical adenopathy.   Neurological: She is alert and oriented to person, place, and time. She has normal strength and normal reflexes. GCS score is 15. GCS eye subscore is 4. GCS verbal subscore is 5. GCS motor subscore is 6.   Skin: Skin is warm and dry. Capillary refill takes less than 2 seconds.   Psychiatric: She has a normal mood and affect.       ED Course   Procedures  Labs Reviewed   POCT URINE PREGNANCY        ECG Results              EKG 12-lead (In process)  Result time 12/04/22 11:41:52      In process by Interface, Lab In [facility] (12/04/22 11:41:52)                   Narrative:    Test Reason : R07.9,    Vent. Rate : 086 BPM     Atrial Rate : 086 BPM     P-R Int : 130 ms          QRS Dur : 070 ms      QT Int : 390 ms       P-R-T Axes : 025 062 060 degrees     QTc Int : 466 ms    Normal sinus rhythm  Nonspecific T wave abnormality  Prolonged QT  Abnormal ECG  No previous ECGs available    Referred By: [email or web address]   SELF           Confirmed By:                                   Imaging Results              X-Ray Chest PA And Lateral (Final  result)  Result time 12/04/22 11:58:50      Final result by Ivan Quevedo MD (12/04/22 11:58:50)                   Narrative:    HISTORY: Chest pain and dyspnea.    FINDINGS: PA and lateral chest radiograph compared to 07/31/2020 show the trachea is midline, with the cardiomediastinal silhouette and pulmonary vascular distribution within normal limits.    The lungs are normally and symmetrically expanded, with no consolidation, pleural effusion or evidence of pulmonary edema. No confluent infiltrates or pneumothorax. There are no significant osseous abnormalities.    IMPRESSION: No evidence of active cardiopulmonary disease.    Electronically signed by:  Ivan Quevedo MD  12/4/2022 11:58 AM CST Workstation: [id number]                                     Medications   levalbuterol nebulizer solution 1.25 mg (1.25 mg Nebulization Given 12/4/22 4766)     Medical Decision Making:   Initial Assessment:   37-year-old female with history of asthma, migraine headaches.  Patient recently diagnosed with COVID proximally 1 week ago.  She presents to the emergency department with complaint of persistent cough, chest tightness since last week.  Patient states that she was diagnosed with COVID proximally for 5 days ago.  At which time she still had difficulty getting air in and out of lungs.  She denies pleuritic chest pain, no nausea, vomiting, has had low-grade fever.  Denies any other constitutional symptoms.    Differential Diagnosis:   COVID-19 pneumonia, viral syndrome, reactive airways disease, acute asthma exacerbation  Clinical Tests:   Radiological Study: Ordered and Reviewed  Patient seen evaluated emergency department.  Chest x-ray showed no evidence of acute infiltrates and pneumonia.  EKG was normal sinus rhythm, normal axis, nonspecific T-wave changes.  Rate was found to be 86.  Patient was given Xopenex neb in emergency department and had resolution of symptoms.  At this time will be discharged with acute asthma  exacerbation, COVID-19 viral infection.  Will be placed on Pulmicort as well as albuterol HFA/neb treatment.  Patient also will begin on prednisone 20 mg daily for next 5 days.  She is to follow with primary care provider this week.  She is return emergency department if problems persist worsens or additional concerns.                        Clinical Impression:   Final diagnoses:  [U07.1] COVID-19 virus infection (Primary)  [J98.01] Bronchospasm  [J45.21] Mild intermittent asthma with acute exacerbation               Humza Chatman MD  12/04/22 0504

## 2023-03-02 ENCOUNTER — OFFICE VISIT (OUTPATIENT)
Dept: URGENT CARE | Facility: CLINIC | Age: 38
End: 2023-03-02
Payer: COMMERCIAL

## 2023-03-02 VITALS
RESPIRATION RATE: 20 BRPM | WEIGHT: 229.63 LBS | DIASTOLIC BLOOD PRESSURE: 84 MMHG | HEART RATE: 85 BPM | BODY MASS INDEX: 42.26 KG/M2 | HEIGHT: 62 IN | TEMPERATURE: 98 F | SYSTOLIC BLOOD PRESSURE: 125 MMHG | OXYGEN SATURATION: 100 %

## 2023-03-02 DIAGNOSIS — Z02.6 ENCOUNTER RELATED TO WORKER'S COMPENSATION CLAIM: Primary | ICD-10-CM

## 2023-03-02 DIAGNOSIS — S00.03XA HEMATOMA OF SCALP, INITIAL ENCOUNTER: ICD-10-CM

## 2023-03-02 PROCEDURE — 99204 OFFICE O/P NEW MOD 45 MIN: CPT | Mod: S$GLB,,, | Performed by: NURSE PRACTITIONER

## 2023-03-02 PROCEDURE — 99204 PR OFFICE/OUTPT VISIT, NEW, LEVL IV, 45-59 MIN: ICD-10-PCS | Mod: S$GLB,,, | Performed by: NURSE PRACTITIONER

## 2023-03-02 NOTE — PROGRESS NOTES
Subjective:       Patient ID: Shalini Stephen is a 37 y.o. female.    Chief Complaint: Work Related Injury    W/C: DOI: 3-02-23 Initial Visit:  37-year-old female employee of [employer] seen today for hematoma to frontal area of scalp.  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  She denies falling or being knocked unconscious.      Constitution: Negative for chills and fever.   HENT:  Negative for ear pain and congestion.    Neck: Negative for neck stiffness.   Cardiovascular:  Negative for chest pain, palpitations and sob on exertion.   Eyes:  Negative for vision loss and blurred vision.   Respiratory:  Negative for shortness of breath.    Gastrointestinal:  Negative for nausea and vomiting.   Skin:  Positive for bruising. Negative for erythema.   Neurological:  Negative for dizziness, light-headedness, passing out, coordination disturbances, headaches, disorientation and altered mental status.   Psychiatric/Behavioral:  Negative for altered mental status, disorientation, confusion, agitation and nervous/anxious. The patient is not nervous/anxious.       Objective:      Physical Exam  Vitals and nursing note reviewed.   Constitutional:       General: She is not in acute distress.     Appearance: Normal appearance. She is not ill-appearing, toxic-appearing or diaphoretic.   HENT:      Head: Normocephalic. Contusion present.      Jaw: No trismus.        Right Ear: Hearing, tympanic membrane, ear canal and external ear normal. No hemotympanum.      Left Ear: Hearing, tympanic membrane, ear canal and external ear normal. No hemotympanum.      Nose: Nose normal.      Mouth/Throat:      Lips: Pink. No lesions.      Mouth: Mucous membranes are moist. No injury or oral lesions.      Dentition: Normal dentition.      Tongue: No lesions. Tongue does not deviate from midline.      Palate: No mass and lesions.      Pharynx: Uvula  midline. No pharyngeal swelling, oropharyngeal exudate, posterior oropharyngeal erythema or uvula swelling.   Eyes:      General: Lids are normal. No scleral icterus.     Extraocular Movements: Extraocular movements intact.      Conjunctiva/sclera: Conjunctivae normal.      Pupils: Pupils are equal, round, and reactive to light.   Cardiovascular:      Rate and Rhythm: Normal rate and regular rhythm.   Pulmonary:      Effort: Pulmonary effort is normal. No respiratory distress.      Breath sounds: Normal breath sounds. No stridor.   Musculoskeletal:         General: Normal range of motion.      Cervical back: Normal range of motion. No rigidity or tenderness.   Skin:     General: Skin is warm and dry.      Capillary Refill: Capillary refill takes 2 to 3 seconds.      Findings: No erythema.   Neurological:      General: No focal deficit present.      Mental Status: She is oriented to person, place, and time. Mental status is at baseline.   Psychiatric:         Mood and Affect: Mood normal.         Behavior: Behavior normal.         Thought Content: Thought content normal.         Judgment: Judgment normal.       Assessment:       1. Encounter related to worker's compensation claim    2. Hematoma of scalp, initial encounter          Plan:         I have discussed the physical exam findings with the patient. I do not suspect an intracranial injury.  We discussed symptom monitoring, conservative care methods, medication use, and follow up orders. She verbalized understanding and agreement with the plan of care.             Patient Instructions: Attention not to aggravate affected area, Apply ice 24-48 hours then apply heat/warm soaks   Restrictions: No above the shoulder/overhead work, No Prolonged standing/walking, No driving company vehicles (No lifting over 2 pounds, No climbing ladders or working at heights, No repeated bending or stooping over, no operating sharp or dangerous equipment.)  Follow up if symptoms  worsen or fail to improve.      Apply ice to hematoma for 15 minutes every 2 hours as needed for comfort and to reduce swelling  May take over-the-counter Tylenol or ibuprofen as needed for pain  Return to work with restrictions as noted  Go directly to the emergency room for any altered level of consciousness, dizziness, blurred vision, other emergent concerns  Follow-up at this clinic on March 7, 2023

## 2023-03-02 NOTE — PATIENT INSTRUCTIONS
Apply ice to hematoma for 15 minutes every 2 hours as needed for comfort and to reduce swelling  May take over-the-counter Tylenol or ibuprofen as needed for pain  Return to work with restrictions as noted  Go directly to the emergency room for any altered level of consciousness, dizziness, blurred vision, other emergent concerns  Follow-up at this clinic on March 7, 2023

## 2023-03-02 NOTE — LETTER
[facility]  [address] [address]  Phone: [phone]  [facility]: [id number]    Pt Name: Shalini Price Date: 03/02/2023   Employee ID:  Date of First Treatment: 03/02/2023   Company: "[employer]      Appointment Time: 08:55 AM Arrived: 9:18   Provider: RAMYA Saez Jr Time Out: 10:32     Office Treatment:   1. Encounter related to worker's compensation claim    2. Hematoma of scalp, initial encounter          Patient Instructions: Attention not to aggravate affected area, Apply ice 24-48 hours then apply heat/warm soaks    Restrictions: No above the shoulder/overhead work, No Prolonged standing/walking, No driving company vehicles (No lifting over 2 pounds, No climbing ladders or working at heights, No repeated bending or stooping over, no operating sharp or dangerous equipment.)     Return Appointment: 03/07/2023

## 2023-03-29 ENCOUNTER — OFFICE VISIT (OUTPATIENT)
Dept: URGENT CARE | Facility: CLINIC | Age: 38
End: 2023-03-29
Payer: COMMERCIAL

## 2023-03-29 VITALS
RESPIRATION RATE: 18 BRPM | HEIGHT: 62 IN | BODY MASS INDEX: 42.14 KG/M2 | WEIGHT: 229 LBS | HEART RATE: 84 BPM | OXYGEN SATURATION: 98 % | SYSTOLIC BLOOD PRESSURE: 124 MMHG | DIASTOLIC BLOOD PRESSURE: 80 MMHG | TEMPERATURE: 98 F

## 2023-03-29 DIAGNOSIS — S00.03XS HEMATOMA OF SCALP, SEQUELA: ICD-10-CM

## 2023-03-29 DIAGNOSIS — Z02.6 ENCOUNTER RELATED TO WORKER'S COMPENSATION CLAIM: Primary | ICD-10-CM

## 2023-03-29 DIAGNOSIS — R42 DIZZINESS: ICD-10-CM

## 2023-03-29 LAB
B-HCG UR QL: NEGATIVE
CTP QC/QA: YES

## 2023-03-29 PROCEDURE — 81025 POCT URINE PREGNANCY: ICD-10-PCS | Mod: S$GLB,,, | Performed by: NURSE PRACTITIONER

## 2023-03-29 PROCEDURE — 99214 OFFICE O/P EST MOD 30 MIN: CPT | Mod: S$GLB,,, | Performed by: NURSE PRACTITIONER

## 2023-03-29 PROCEDURE — 81025 URINE PREGNANCY TEST: CPT | Mod: S$GLB,,, | Performed by: NURSE PRACTITIONER

## 2023-03-29 PROCEDURE — 99214 PR OFFICE/OUTPT VISIT, EST, LEVL IV, 30-39 MIN: ICD-10-PCS | Mod: S$GLB,,, | Performed by: NURSE PRACTITIONER

## 2023-03-29 NOTE — PATIENT INSTRUCTIONS
Return to work with restrictions  If dizziness become severe go directly to the ER  A CT of your head has been ordered once it is approved someone will call you and schedule it  Return to work with restrictions as noted  Return to this clinic April 5, 2023

## 2023-03-29 NOTE — LETTER
[facility]  [address] [address]  Phone: [phone]  [facility]: [id number]    Pt Name: Shalini Stephen  Injury Date: 03/02/2023   Employee ID:  Date of First Treatment: 03/02/2023   Company: [employer]      Appointment Time: 12:25 PM Arrived: 12:39 pm   Provider: RAMYA Saez Jr Time Out: 1:37 pm     Office Treatment:   1. Encounter related to worker's compensation claim    2. Hematoma of scalp, sequela    3. Dizziness                     Return Appointment: 4/5/2023 Follow up with Neuro and CT scan

## 2023-03-29 NOTE — PROGRESS NOTES
"Subjective:      Patient ID: Shalini Stephen is a 37 y.o. female.    Vitals:  height is 5' 2" (1.575 m) and weight is 103.9 kg (229 lb). Her temperature is 97.8 °F (36.6 °C). Her blood pressure is 124/80 and her pulse is 84. Her respiration is 18 and oxygen saturation is 98%.     Chief Complaint: No chief complaint on file.    37-year-old female seen for repeat visit for occupational medicine.  She was originally seen on March 2, 2023 and ordered to return for re-evaluation March 7, 2023.  She did not return and there are no recorded attempts of her attempting to contact the clinic to reschedule.  She arrives today stating that she has continued to work and is now having headaches, dizziness, and difficulty concentrating.  She denies seeking any care before today.  She has taken Tylenol with minimal improvement.      Constitution: Negative for chills and fever.   HENT:  Negative for ear pain, congestion and sore throat.    Neck: Negative for neck pain and painful lymph nodes.   Cardiovascular:  Negative for chest pain, palpitations and sob on exertion.   Eyes:  Negative for photophobia.   Respiratory:  Negative for cough and shortness of breath.    Gastrointestinal:  Negative for nausea and vomiting.   Musculoskeletal:  Positive for trauma. Negative for pain and back pain.   Skin:  Negative for rash and erythema.   Neurological:  Positive for dizziness, coordination disturbances and headaches. Negative for light-headedness, passing out, facial drooping, altered mental status, loss of consciousness, numbness and tingling.   Hematologic/Lymphatic: Negative for swollen lymph nodes.   Psychiatric/Behavioral:  Negative for altered mental status, confusion and agitation.     Objective:     Physical Exam   Constitutional: She is oriented to person, place, and time. She appears well-developed. She is cooperative.  Non-toxic appearance. She does not appear ill. No distress.   HENT:   Head: Normocephalic and atraumatic. "   Ears:   Right Ear: Hearing, tympanic membrane, external ear and ear canal normal. No hemotympanum.   Left Ear: Hearing, tympanic membrane, external ear and ear canal normal. No hemotympanum.   Nose: Nose normal. No rhinorrhea. Right sinus exhibits no maxillary sinus tenderness and no frontal sinus tenderness. Left sinus exhibits no maxillary sinus tenderness and no frontal sinus tenderness.   Mouth/Throat: Uvula is midline, oropharynx is clear and moist and mucous membranes are normal. Mucous membranes are moist. Oropharynx is clear.   Eyes: Conjunctivae and lids are normal. Pupils are equal, round, and reactive to light. No scleral icterus. Extraocular movement intact vision grossly intact gaze aligned appropriately   Neck: Trachea normal and phonation normal. Neck supple. No neck rigidity present.   Cardiovascular: Normal rate, regular rhythm, normal heart sounds and normal pulses.   Pulmonary/Chest: Effort normal and breath sounds normal.   Abdominal: Normal appearance.   Musculoskeletal:         General: No deformity.      Cervical back: She exhibits no tenderness.   Lymphadenopathy:     She has no cervical adenopathy.   Neurological: no focal deficit. She is alert and oriented to person, place, and time. She has normal motor skills, normal sensation, normal strength and normal reflexes. No cranial nerve deficit or sensory deficit. Gait and coordination normal. GCS eye subscore is 4. GCS verbal subscore is 5. GCS motor subscore is 6.   Skin: Skin is warm, dry, intact and not diaphoretic. Capillary refill takes 2 to 3 seconds. No erythema   Psychiatric: Her speech is normal and behavior is normal. Judgment and thought content normal.   Nursing note and vitals reviewed.    Assessment:     1. Encounter related to worker's compensation claim    2. Hematoma of scalp, sequela    3. Dizziness        Plan:       Encounter related to worker's compensation claim  -     POCT urine pregnancy  -     CT Head Without  Contrast; Future; Expected date: 03/29/2023  -     Ambulatory referral/consult to Neurology    Hematoma of scalp, sequela  -     POCT urine pregnancy  -     CT Head Without Contrast; Future; Expected date: 03/29/2023  -     Ambulatory referral/consult to Neurology    Dizziness  -     POCT urine pregnancy  -     CT Head Without Contrast; Future; Expected date: 03/29/2023  -     Ambulatory referral/consult to Neurology      UPT negative    I have discussed the physical exam findings with the patient. I am ordering a non contrast head ct and referring to neuro for consult. We discussed symptom monitoring, conservative care methods, medication use, and follow up orders. The patient verbalized understanding and agreement with the plan of care.

## 2023-03-29 NOTE — PROGRESS NOTES
"Subjective:      Patient ID: Shalini Stephen is a 37 y.o. female.    Chief Complaint: No chief complaint on file.    DOI: 03/02/2023 WC: pt states " She is feeling fatigue, dizziness and a pain to the top of the head which causes it to be hard to concentrate at work.Took tylenol , did not give any relief."    ROS  Objective:     Physical Exam   Assessment:      1. Encounter related to worker's compensation claim      Plan:                 No follow-ups on file.      "

## 2023-04-05 ENCOUNTER — OFFICE VISIT (OUTPATIENT)
Dept: URGENT CARE | Facility: CLINIC | Age: 38
End: 2023-04-05
Payer: COMMERCIAL

## 2023-04-05 VITALS
DIASTOLIC BLOOD PRESSURE: 85 MMHG | HEIGHT: 62 IN | SYSTOLIC BLOOD PRESSURE: 112 MMHG | OXYGEN SATURATION: 96 % | WEIGHT: 230 LBS | HEART RATE: 80 BPM | BODY MASS INDEX: 42.33 KG/M2 | RESPIRATION RATE: 18 BRPM | TEMPERATURE: 98 F

## 2023-04-05 DIAGNOSIS — Z02.6 ENCOUNTER RELATED TO WORKER'S COMPENSATION CLAIM: Primary | ICD-10-CM

## 2023-04-05 DIAGNOSIS — R51.9 NONINTRACTABLE HEADACHE, UNSPECIFIED CHRONICITY PATTERN, UNSPECIFIED HEADACHE TYPE: ICD-10-CM

## 2023-04-05 DIAGNOSIS — S09.90XS TRAUMATIC INJURY OF HEAD, SEQUELA: ICD-10-CM

## 2023-04-05 DIAGNOSIS — R42 DIZZINESS: ICD-10-CM

## 2023-04-05 PROCEDURE — 99213 PR OFFICE/OUTPT VISIT, EST, LEVL III, 20-29 MIN: ICD-10-PCS | Mod: S$GLB,,, | Performed by: NURSE PRACTITIONER

## 2023-04-05 PROCEDURE — 99213 OFFICE O/P EST LOW 20 MIN: CPT | Mod: S$GLB,,, | Performed by: NURSE PRACTITIONER

## 2023-04-05 RX ORDER — AMOXICILLIN 500 MG/1
CAPSULE ORAL
COMMUNITY
Start: 2023-04-03 | End: 2023-06-20

## 2023-04-05 RX ORDER — TRAMADOL HYDROCHLORIDE 50 MG/1
50 TABLET ORAL
COMMUNITY
Start: 2023-04-03 | End: 2023-06-20

## 2023-04-05 NOTE — PATIENT INSTRUCTIONS
Someone should be calling you to schedule appointment for CT scan of the head and neurology evaluation once approved by workman's comp.    Return to work okay with work restrictions not to aggravate symptoms.    Return to clinic on April 19th for re-evaluation or sooner if needed

## 2023-04-05 NOTE — PROGRESS NOTES
"Subjective:      Patient ID: Shalini Stephen is a 37 y.o. female.    Vitals:  height is 5' 2" (1.575 m) and weight is 104.3 kg (230 lb). Her oral temperature is 98 °F (36.7 °C). Her blood pressure is 112/85 and her pulse is 80. Her respiration is 18 and oxygen saturation is 96%.     Chief Complaint: No chief complaint on file.    W/C R/V DOI:3/02/23 Pt states that she is doing much better she is still experiencing headaches but not as bad as the initial trauma. She has not been able to get a CT scan. Nobody has called her top schedule the scan."    CT of head and neuro referral pending authorization.    Note from 03/29/23: "37-year-old female seen for repeat visit for occupational medicine.  She was originally seen on March 2, 2023 and ordered to return for re-evaluation March 7, 2023.  She did not return and there are no recorded attempts of her attempting to contact the clinic to reschedule.  She arrives today stating that she has continued to work and is now having headaches, dizziness, and difficulty concentrating.  She denies seeking any care before today.  She has taken Tylenol with minimal improvement."      Constitution: Negative for appetite change, chills, fatigue and fever.   Eyes:  Negative for photophobia, vision loss, double vision and blurred vision.   Gastrointestinal:  Negative for nausea and vomiting.   Musculoskeletal:  Negative for pain.   Skin:  Negative for erythema.   Neurological:  Positive for dizziness and headaches (Mild, infrequent.  Random). Negative for history of vertigo, passing out, facial drooping, speech difficulty, coordination disturbances, loss of balance, disorientation, altered mental status, loss of consciousness, numbness and tingling.   Psychiatric/Behavioral:  Negative for altered mental status, disorientation, confusion and sleep disturbance.         Reports difficulty concentrating    Objective:     Physical Exam   Constitutional: She is oriented to person, place, and " time. She appears well-developed.  Non-toxic appearance. She does not appear ill. No distress.   HENT:   Head: Normocephalic and atraumatic.   Ears:   Right Ear: Tympanic membrane, external ear and ear canal normal.   Left Ear: Tympanic membrane, external ear and ear canal normal.   Nose: Nose normal.   Mouth/Throat: Oropharynx is clear and moist. Mucous membranes are moist.   Eyes: Conjunctivae and EOM are normal. Pupils are equal, round, and reactive to light. Right eye exhibits no discharge. Left eye exhibits no discharge. Extraocular movement intact   Cardiovascular: Normal rate, regular rhythm and normal heart sounds.   Pulmonary/Chest: Effort normal and breath sounds normal. No respiratory distress. She has no wheezes. She has no rhonchi. She has no rales.   Abdominal: Normal appearance.   Neurological: no focal deficit. She is alert and oriented to person, place, and time. She has normal motor skills. She displays no weakness. No sensory deficit. She exhibits normal muscle tone. She has a normal Finger-Nose-Finger Test and a normal Tandem Gait Test. Coordination: Romberg sign negative and Heel to shin test normal. Gait and coordination normal. Coordination and gait normal.   Skin: Skin is warm, dry and no rash. Capillary refill takes 2 to 3 seconds. No bruising, No erythema and No lesion   Psychiatric: Her behavior is normal. Mood normal.   Nursing note and vitals reviewed.    Assessment:     1. Encounter related to worker's compensation claim    2. Traumatic injury of head, sequela    3. Dizziness    4. Nonintractable headache, unspecified chronicity pattern, unspecified headache type        Plan:       Encounter related to worker's compensation claim    Traumatic injury of head, sequela    Dizziness    Nonintractable headache, unspecified chronicity pattern, unspecified headache type

## 2023-04-05 NOTE — LETTER
[facility]  [address] [address]  Phone: [phone]  [facility]: [id number]    Pt Name: Shalini Price Date: 03/02/2023   Employee ID:  Date of First Treatment: 04/05/2023   Company: [employer]      Appointment Time: 10:00 AM Arrived: 10:14   Provider: Paulina Mcwilliams NP Time Out:11:22     Office Treatment:   1. Encounter related to worker's compensation claim    2. Traumatic injury of head, sequela    3. Dizziness    4. Nonintractable headache, unspecified chronicity pattern, unspecified headache type          Patient Instructions: Attention not to aggravate affected area          Return Appointment: 04/19/2023

## 2023-04-11 ENCOUNTER — PATIENT MESSAGE (OUTPATIENT)
Dept: ADMINISTRATIVE | Facility: HOSPITAL | Age: 38
End: 2023-04-11
Payer: COMMERCIAL

## 2023-04-12 ENCOUNTER — HOSPITAL ENCOUNTER (OUTPATIENT)
Dept: RADIOLOGY | Facility: HOSPITAL | Age: 38
Discharge: HOME | End: 2023-04-12
Attending: NURSE PRACTITIONER
Payer: COMMERCIAL

## 2023-04-12 DIAGNOSIS — R42 DIZZINESS: ICD-10-CM

## 2023-04-12 DIAGNOSIS — S00.03XS HEMATOMA OF SCALP, SEQUELA: ICD-10-CM

## 2023-04-12 DIAGNOSIS — Z02.6 ENCOUNTER RELATED TO WORKER'S COMPENSATION CLAIM: ICD-10-CM

## 2023-04-14 ENCOUNTER — HOSPITAL ENCOUNTER (OUTPATIENT)
Dept: RADIOLOGY | Facility: HOSPITAL | Age: 38
Discharge: HOME | End: 2023-04-14
Attending: NURSE PRACTITIONER
Payer: COMMERCIAL

## 2023-04-14 PROCEDURE — 70450 CT HEAD/BRAIN W/O DYE: CPT | Mod: TC

## 2023-04-19 ENCOUNTER — OFFICE VISIT (OUTPATIENT)
Dept: URGENT CARE | Facility: CLINIC | Age: 38
End: 2023-04-19
Payer: COMMERCIAL

## 2023-04-19 ENCOUNTER — TELEPHONE (OUTPATIENT)
Dept: NEUROLOGY | Facility: CLINIC | Age: 38
End: 2023-04-19
Payer: COMMERCIAL

## 2023-04-19 VITALS
TEMPERATURE: 98 F | RESPIRATION RATE: 18 BRPM | OXYGEN SATURATION: 99 % | HEIGHT: 62 IN | SYSTOLIC BLOOD PRESSURE: 121 MMHG | DIASTOLIC BLOOD PRESSURE: 81 MMHG | BODY MASS INDEX: 42.69 KG/M2 | WEIGHT: 232 LBS | HEART RATE: 99 BPM

## 2023-04-19 DIAGNOSIS — R42 DIZZINESS: ICD-10-CM

## 2023-04-19 DIAGNOSIS — Z02.6 ENCOUNTER RELATED TO WORKER'S COMPENSATION CLAIM: Primary | ICD-10-CM

## 2023-04-19 DIAGNOSIS — S09.90XS TRAUMATIC INJURY OF HEAD, SEQUELA: ICD-10-CM

## 2023-04-19 PROCEDURE — 99213 PR OFFICE/OUTPT VISIT, EST, LEVL III, 20-29 MIN: ICD-10-PCS | Mod: S$GLB,,, | Performed by: NURSE PRACTITIONER

## 2023-04-19 PROCEDURE — 99213 OFFICE O/P EST LOW 20 MIN: CPT | Mod: S$GLB,,, | Performed by: NURSE PRACTITIONER

## 2023-04-19 NOTE — PROGRESS NOTES
"Subjective:      Patient ID: Shalini Stephen is a 37 y.o. female.    Vitals:  height is 5' 2" (1.575 m) and weight is 105.2 kg (232 lb). Her oral temperature is 97.8 °F (36.6 °C). Her blood pressure is 121/81 and her pulse is 99. Her respiration is 18 and oxygen saturation is 99%.     Chief Complaint: Work Related Injury    W/c F/U Pt states "she feels a lot better. She still feels a little dizzy and tired at times."    Note from 03/29/23: "37-year-old female seen for repeat visit for occupational medicine.  She was originally seen on March 2, 2023 and ordered to return for re-evaluation March 7, 2023.  She did not return and there are no recorded attempts of her attempting to contact the clinic to reschedule.  She arrives today stating that she has continued to work and is now having headaches, dizziness, and difficulty concentrating.  She denies seeking any care before today.  She has taken Tylenol with minimal improvement."    Constitution: Positive for fatigue.   Neurological:  Positive for dizziness (with focus).    Objective:     Physical Exam   Constitutional: She is oriented to person, place, and time. She is cooperative.  Non-toxic appearance. She does not appear ill. No distress. awake  HENT:   Head: Normocephalic and atraumatic.   Nose: Nose normal.   Mouth/Throat: Mucous membranes are moist.   Eyes: Conjunctivae are normal. Right eye exhibits no discharge. Left eye exhibits no discharge.   Cardiovascular: Normal rate.   Pulmonary/Chest: Effort normal. No accessory muscle usage. No tachypnea. No respiratory distress.   Abdominal: Normal appearance.   Neurological: no focal deficit. She is alert and oriented to person, place, and time. She displays no weakness. No sensory deficit. Coordination and gait normal.   Skin: Skin is warm, dry, not diaphoretic and no rash. Capillary refill takes 2 to 3 seconds.   Psychiatric: Her behavior is normal. Mood, judgment and thought content normal.   Nursing note and " vitals reviewed.    Assessment:     1. Encounter related to worker's compensation claim    2. Traumatic injury of head, sequela    3. Dizziness        Plan:       Encounter related to worker's compensation claim    Traumatic injury of head, sequela    Dizziness

## 2023-04-19 NOTE — LETTER
[facility]  [address] [address]  Phone: [phone]  [facility]: [id number]    Pt Name: Shalini Price Date: 03/02/2023   Employee ID:  Date of First Treatment: 04/19/2023   Company: [employer]      Appointment Time: 07:45 AM Arrived: 11:53   Provider: Paulina Mcwilliams NP Time Out:1:00     Office Treatment:   1. 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 Pending Neurology appointment.

## 2023-04-19 NOTE — PATIENT INSTRUCTIONS
During to regular duty.    Return to clinic as needed no follow-up appointments required at this time to clinic pending neurology consult  Referral to Neurology has been authorized.  Someone should be calling you to schedule an appointment

## 2023-05-22 ENCOUNTER — OFFICE VISIT (OUTPATIENT)
Dept: FAMILY MEDICINE | Facility: CLINIC | Age: 38
End: 2023-05-22
Payer: COMMERCIAL

## 2023-05-22 ENCOUNTER — TELEPHONE (OUTPATIENT)
Dept: NEUROLOGY | Facility: CLINIC | Age: 38
End: 2023-05-22
Payer: COMMERCIAL

## 2023-05-22 ENCOUNTER — TELEPHONE (OUTPATIENT)
Dept: FAMILY MEDICINE | Facility: CLINIC | Age: 38
End: 2023-05-22

## 2023-05-22 VITALS
TEMPERATURE: 97 F | BODY MASS INDEX: 42.47 KG/M2 | OXYGEN SATURATION: 99 % | HEIGHT: 62 IN | DIASTOLIC BLOOD PRESSURE: 72 MMHG | WEIGHT: 230.81 LBS | HEART RATE: 77 BPM | SYSTOLIC BLOOD PRESSURE: 122 MMHG

## 2023-05-22 DIAGNOSIS — G43.909 MIGRAINE WITHOUT STATUS MIGRAINOSUS, NOT INTRACTABLE, UNSPECIFIED MIGRAINE TYPE: Primary | ICD-10-CM

## 2023-05-22 DIAGNOSIS — D64.9 ANEMIA, UNSPECIFIED TYPE: ICD-10-CM

## 2023-05-22 DIAGNOSIS — Z13.1 SCREENING FOR DIABETES MELLITUS (DM): ICD-10-CM

## 2023-05-22 DIAGNOSIS — F41.9 ANXIETY: ICD-10-CM

## 2023-05-22 PROCEDURE — 1159F MED LIST DOCD IN RCRD: CPT | Mod: CPTII,S$GLB,, | Performed by: FAMILY MEDICINE

## 2023-05-22 PROCEDURE — 3008F BODY MASS INDEX DOCD: CPT | Mod: CPTII,S$GLB,, | Performed by: FAMILY MEDICINE

## 2023-05-22 PROCEDURE — 3074F SYST BP LT 130 MM HG: CPT | Mod: CPTII,S$GLB,, | Performed by: FAMILY MEDICINE

## 2023-05-22 PROCEDURE — 1159F PR MEDICATION LIST DOCUMENTED IN MEDICAL RECORD: ICD-10-PCS | Mod: CPTII,S$GLB,, | Performed by: FAMILY MEDICINE

## 2023-05-22 PROCEDURE — 99214 PR OFFICE/OUTPT VISIT, EST, LEVL IV, 30-39 MIN: ICD-10-PCS | Mod: S$GLB,,, | Performed by: FAMILY MEDICINE

## 2023-05-22 PROCEDURE — 1160F PR REVIEW ALL MEDS BY PRESCRIBER/CLIN PHARMACIST DOCUMENTED: ICD-10-PCS | Mod: CPTII,S$GLB,, | Performed by: FAMILY MEDICINE

## 2023-05-22 PROCEDURE — 3078F DIAST BP <80 MM HG: CPT | Mod: CPTII,S$GLB,, | Performed by: FAMILY MEDICINE

## 2023-05-22 PROCEDURE — 1160F RVW MEDS BY RX/DR IN RCRD: CPT | Mod: CPTII,S$GLB,, | Performed by: FAMILY MEDICINE

## 2023-05-22 PROCEDURE — 3078F PR MOST RECENT DIASTOLIC BLOOD PRESSURE < 80 MM HG: ICD-10-PCS | Mod: CPTII,S$GLB,, | Performed by: FAMILY MEDICINE

## 2023-05-22 PROCEDURE — 3008F PR BODY MASS INDEX (BMI) DOCUMENTED: ICD-10-PCS | Mod: CPTII,S$GLB,, | Performed by: FAMILY MEDICINE

## 2023-05-22 PROCEDURE — 3074F PR MOST RECENT SYSTOLIC BLOOD PRESSURE < 130 MM HG: ICD-10-PCS | Mod: CPTII,S$GLB,, | Performed by: FAMILY MEDICINE

## 2023-05-22 PROCEDURE — 99214 OFFICE O/P EST MOD 30 MIN: CPT | Mod: S$GLB,,, | Performed by: FAMILY MEDICINE

## 2023-05-22 RX ORDER — SUMATRIPTAN SUCCINATE 100 MG/1
TABLET ORAL
Qty: 9 TABLET | Refills: 2 | Status: SHIPPED | OUTPATIENT
Start: 2023-05-22 | End: 2023-07-19 | Stop reason: SDUPTHER

## 2023-05-22 RX ORDER — ALPRAZOLAM 0.5 MG/1
0.5 TABLET ORAL DAILY PRN
Qty: 30 TABLET | Refills: 0 | Status: SHIPPED | OUTPATIENT
Start: 2023-05-22 | End: 2024-04-03 | Stop reason: SDUPTHER

## 2023-05-22 NOTE — TELEPHONE ENCOUNTER
Tried to call patient to let her know her medication will be sent to the pharmacy asap when Dr Mathis is finished with his clinic for the day.  No room to leave a voice mail

## 2023-05-24 NOTE — PROGRESS NOTES
Subjective:       Patient ID: Shalini Stephen is a 37 y.o. female.    Chief Complaint: Follow-up    Migraine headaches.  Needs refill Imitrex.  Sometimes headaches will last up to 3 days.  Last bad 1 2 weeks ago.  They are sporadic.  Anxiety rare Xanax use.  Needs a refill.  BMI is up in down.  Around 46.  Anemia check of this is due.  On vitamins.  Occasional iron.      Physical examination.  Vital signs noted.  Neuro intact.  Neck without bruit.  Chest clear.  Heart regular rate and rhythm.      Objective:        Assessment:       1. Migraine without status migrainosus, not intractable, unspecified migraine type    2. Anxiety    3. Anemia, unspecified type    4. Screening for diabetes mellitus (DM)    5. BMI 40.0-44.9, adult        Plan:       Migraine without status migrainosus, not intractable, unspecified migraine type  -     Comprehensive Metabolic Panel; Future; Expected date: 05/22/2023    Anxiety  -     TSH; Future; Expected date: 05/22/2023  -     Lipid Panel; Future; Expected date: 05/22/2023    Anemia, unspecified type  -     CBC Auto Differential; Future; Expected date: 05/22/2023  -     Ferritin; Future; Expected date: 05/22/2023  -     Lipid Panel; Future; Expected date: 05/22/2023    Screening for diabetes mellitus (DM)  -     Hemoglobin A1C; Future; Expected date: 05/22/2023    BMI 40.0-44.9, adult    Other orders  -     sumatriptan (IMITREX) 100 MG tablet; TAKE ONE TABLET PO ONSET OF MIGRAIN MAX  Dispense: 9 tablet; Refill: 2  -     ALPRAZolam (XANAX) 0.5 MG tablet; Take 1 tablet (0.5 mg total) by mouth daily as needed for Anxiety.  Dispense: 30 tablet; Refill: 0      Refill Imitrex.  CBC ferritin TSH A1c CMP and lipids ordered.  Needs to go for Pap smear.  Refill Xanax 0.5 30 of these with no refills use p.r.n. only.

## 2023-05-26 ENCOUNTER — LAB VISIT (OUTPATIENT)
Dept: LAB | Facility: HOSPITAL | Age: 38
End: 2023-05-26
Attending: FAMILY MEDICINE
Payer: COMMERCIAL

## 2023-05-26 DIAGNOSIS — F41.9 ANXIETY: ICD-10-CM

## 2023-05-26 DIAGNOSIS — D64.9 ANEMIA, UNSPECIFIED TYPE: ICD-10-CM

## 2023-05-26 DIAGNOSIS — Z13.1 SCREENING FOR DIABETES MELLITUS (DM): ICD-10-CM

## 2023-05-26 DIAGNOSIS — G43.909 MIGRAINE WITHOUT STATUS MIGRAINOSUS, NOT INTRACTABLE, UNSPECIFIED MIGRAINE TYPE: ICD-10-CM

## 2023-05-26 LAB
ALBUMIN SERPL BCP-MCNC: 3.8 G/DL (ref 3.5–5.2)
ALP SERPL-CCNC: 60 U/L (ref 55–135)
ALT SERPL W/O P-5'-P-CCNC: 18 U/L (ref 10–44)
ANION GAP SERPL CALC-SCNC: 5 MMOL/L (ref 8–16)
AST SERPL-CCNC: 16 U/L (ref 10–40)
BASOPHILS # BLD AUTO: 0.04 K/UL (ref 0–0.2)
BASOPHILS NFR BLD: 0.5 % (ref 0–1.9)
BILIRUB SERPL-MCNC: 0.5 MG/DL (ref 0.1–1)
BUN SERPL-MCNC: 14 MG/DL (ref 6–20)
CALCIUM SERPL-MCNC: 8.4 MG/DL (ref 8.7–10.5)
CHLORIDE SERPL-SCNC: 107 MMOL/L (ref 95–110)
CHOLEST SERPL-MCNC: 176 MG/DL (ref 120–199)
CHOLEST/HDLC SERPL: 3.1 {RATIO} (ref 2–5)
CO2 SERPL-SCNC: 24 MMOL/L (ref 23–29)
CREAT SERPL-MCNC: 0.7 MG/DL (ref 0.5–1.4)
DIFFERENTIAL METHOD: ABNORMAL
EOSINOPHIL # BLD AUTO: 0.2 K/UL (ref 0–0.5)
EOSINOPHIL NFR BLD: 2.2 % (ref 0–8)
ERYTHROCYTE [DISTWIDTH] IN BLOOD BY AUTOMATED COUNT: 16.7 % (ref 11.5–14.5)
EST. GFR  (NO RACE VARIABLE): >60 ML/MIN/1.73 M^2
ESTIMATED AVG GLUCOSE: 105 MG/DL (ref 68–131)
FERRITIN SERPL-MCNC: 5 NG/ML (ref 20–300)
GLUCOSE SERPL-MCNC: 86 MG/DL (ref 70–110)
HBA1C MFR BLD: 5.3 % (ref 4.5–6.2)
HCT VFR BLD AUTO: 34.5 % (ref 37–48.5)
HDLC SERPL-MCNC: 57 MG/DL (ref 40–75)
HDLC SERPL: 32.4 % (ref 20–50)
HGB BLD-MCNC: 11 G/DL (ref 12–16)
IMM GRANULOCYTES # BLD AUTO: 0.03 K/UL (ref 0–0.04)
IMM GRANULOCYTES NFR BLD AUTO: 0.4 % (ref 0–0.5)
LDLC SERPL CALC-MCNC: 116.6 MG/DL (ref 63–159)
LYMPHOCYTES # BLD AUTO: 1.9 K/UL (ref 1–4.8)
LYMPHOCYTES NFR BLD: 25.5 % (ref 18–48)
MCH RBC QN AUTO: 25.5 PG (ref 27–31)
MCHC RBC AUTO-ENTMCNC: 31.9 G/DL (ref 32–36)
MCV RBC AUTO: 80 FL (ref 82–98)
MONOCYTES # BLD AUTO: 0.6 K/UL (ref 0.3–1)
MONOCYTES NFR BLD: 7.6 % (ref 4–15)
NEUTROPHILS # BLD AUTO: 4.7 K/UL (ref 1.8–7.7)
NEUTROPHILS NFR BLD: 63.8 % (ref 38–73)
NONHDLC SERPL-MCNC: 119 MG/DL
NRBC BLD-RTO: 0 /100 WBC
PLATELET # BLD AUTO: 391 K/UL (ref 150–450)
PMV BLD AUTO: 10 FL (ref 9.2–12.9)
POTASSIUM SERPL-SCNC: 4 MMOL/L (ref 3.5–5.1)
PROT SERPL-MCNC: 7 G/DL (ref 6–8.4)
RBC # BLD AUTO: 4.31 M/UL (ref 4–5.4)
SODIUM SERPL-SCNC: 136 MMOL/L (ref 136–145)
TRIGL SERPL-MCNC: 12 MG/DL (ref 30–150)
TSH SERPL DL<=0.005 MIU/L-ACNC: 0.89 UIU/ML (ref 0.34–5.6)
WBC # BLD AUTO: 7.36 K/UL (ref 3.9–12.7)

## 2023-05-26 PROCEDURE — 84443 ASSAY THYROID STIM HORMONE: CPT | Performed by: FAMILY MEDICINE

## 2023-05-26 PROCEDURE — 82728 ASSAY OF FERRITIN: CPT | Performed by: FAMILY MEDICINE

## 2023-05-26 PROCEDURE — 80053 COMPREHEN METABOLIC PANEL: CPT | Performed by: FAMILY MEDICINE

## 2023-05-26 PROCEDURE — 83036 HEMOGLOBIN GLYCOSYLATED A1C: CPT | Performed by: FAMILY MEDICINE

## 2023-05-26 PROCEDURE — 85025 COMPLETE CBC W/AUTO DIFF WBC: CPT | Performed by: FAMILY MEDICINE

## 2023-05-26 PROCEDURE — 36415 COLL VENOUS BLD VENIPUNCTURE: CPT | Performed by: FAMILY MEDICINE

## 2023-05-26 PROCEDURE — 80061 LIPID PANEL: CPT | Performed by: FAMILY MEDICINE

## 2023-06-20 ENCOUNTER — OFFICE VISIT (OUTPATIENT)
Dept: NEUROLOGY | Facility: CLINIC | Age: 38
End: 2023-06-20
Payer: COMMERCIAL

## 2023-06-20 VITALS — DIASTOLIC BLOOD PRESSURE: 74 MMHG | SYSTOLIC BLOOD PRESSURE: 116 MMHG | HEART RATE: 92 BPM

## 2023-06-20 DIAGNOSIS — G43.009 MIGRAINE WITHOUT AURA AND WITHOUT STATUS MIGRAINOSUS, NOT INTRACTABLE: ICD-10-CM

## 2023-06-20 DIAGNOSIS — S06.0X0S CONCUSSION WITHOUT LOSS OF CONSCIOUSNESS, SEQUELA: Primary | ICD-10-CM

## 2023-06-20 PROBLEM — S06.0X0A CONCUSSION WITH NO LOSS OF CONSCIOUSNESS: Status: ACTIVE | Noted: 2023-06-20

## 2023-06-20 PROCEDURE — 3078F PR MOST RECENT DIASTOLIC BLOOD PRESSURE < 80 MM HG: ICD-10-PCS | Mod: CPTII,S$GLB,, | Performed by: PSYCHIATRY & NEUROLOGY

## 2023-06-20 PROCEDURE — 3078F DIAST BP <80 MM HG: CPT | Mod: CPTII,S$GLB,, | Performed by: PSYCHIATRY & NEUROLOGY

## 2023-06-20 PROCEDURE — 1160F RVW MEDS BY RX/DR IN RCRD: CPT | Mod: CPTII,S$GLB,, | Performed by: PSYCHIATRY & NEUROLOGY

## 2023-06-20 PROCEDURE — 99999 PR PBB SHADOW E&M-EST. PATIENT-LVL III: ICD-10-PCS | Mod: PBBFAC,,, | Performed by: PSYCHIATRY & NEUROLOGY

## 2023-06-20 PROCEDURE — 3044F PR MOST RECENT HEMOGLOBIN A1C LEVEL <7.0%: ICD-10-PCS | Mod: CPTII,S$GLB,, | Performed by: PSYCHIATRY & NEUROLOGY

## 2023-06-20 PROCEDURE — 99204 PR OFFICE/OUTPT VISIT, NEW, LEVL IV, 45-59 MIN: ICD-10-PCS | Mod: S$GLB,,, | Performed by: PSYCHIATRY & NEUROLOGY

## 2023-06-20 PROCEDURE — 99999 PR PBB SHADOW E&M-EST. PATIENT-LVL III: CPT | Mod: PBBFAC,,, | Performed by: PSYCHIATRY & NEUROLOGY

## 2023-06-20 PROCEDURE — 3074F PR MOST RECENT SYSTOLIC BLOOD PRESSURE < 130 MM HG: ICD-10-PCS | Mod: CPTII,S$GLB,, | Performed by: PSYCHIATRY & NEUROLOGY

## 2023-06-20 PROCEDURE — 1159F PR MEDICATION LIST DOCUMENTED IN MEDICAL RECORD: ICD-10-PCS | Mod: CPTII,S$GLB,, | Performed by: PSYCHIATRY & NEUROLOGY

## 2023-06-20 PROCEDURE — 1159F MED LIST DOCD IN RCRD: CPT | Mod: CPTII,S$GLB,, | Performed by: PSYCHIATRY & NEUROLOGY

## 2023-06-20 PROCEDURE — 99204 OFFICE O/P NEW MOD 45 MIN: CPT | Mod: S$GLB,,, | Performed by: PSYCHIATRY & NEUROLOGY

## 2023-06-20 PROCEDURE — 1160F PR REVIEW ALL MEDS BY PRESCRIBER/CLIN PHARMACIST DOCUMENTED: ICD-10-PCS | Mod: CPTII,S$GLB,, | Performed by: PSYCHIATRY & NEUROLOGY

## 2023-06-20 PROCEDURE — 3044F HG A1C LEVEL LT 7.0%: CPT | Mod: CPTII,S$GLB,, | Performed by: PSYCHIATRY & NEUROLOGY

## 2023-06-20 PROCEDURE — 3074F SYST BP LT 130 MM HG: CPT | Mod: CPTII,S$GLB,, | Performed by: PSYCHIATRY & NEUROLOGY

## 2023-06-20 NOTE — PATIENT INSTRUCTIONS
May slowly and progressively increase physical activity as tolerated without restrictions  May do full duty work without restrictions   I can refill sumatriptan in the future if your primary care does not

## 2023-06-20 NOTE — PROGRESS NOTES
Chief Complaint: Head Injury    Subjective:     History of Present Illness    Referring Provider: Workers comp  Date of Injury: 3/2/23  Accompanied by:   Workers Comp    06/20/2023: Shalini Stephen is a 37 y.o. female with h/o anxiety, migraine without aura who presents for concussion evaluation. On 3/2/23, she was getting ice from machine and she was reaching for ice bucket one of the buckets fell on head and she broke the handle on the bucket possibly because it hit her head, bucket was empty and hard plastic, hit top of head and caused a lump, immediately pain at impact site and felt tired, not knocked to the ground, denies LOC, denies amnesia, does not remember sound of impact. Currently, her headaches were more frequent than baseline 1 month ago and are now back at her baseline headaches and last headache was 1 month ago that lasted for 3 days and headaches were either side, occipital. She denies neck pain. She denies photophobia, phonophobia, weakness, numbness, tingling, dizziness, spinning, imbalance, lightheadedness, N/V. She has had change in taste since having Covid Thanksgiving 2022 and did not change with above injury and notes also loses taste with allergies. She denies changes in smell, hearing, vision, appetite. She denies tinnitus. She had cognitive fogginess that she believes is back to her baseline. She denies concentration difficulties and memory changes. She is sleeping okay and normal for her and wakes up tired, which is her baseline. She and  deny apnea and per  she snores sometimes and did not worsen with above injury. Headaches improved lying down and vasal vagal maneuvers do not significantly worsen headaches. She denies headaches waking her up and does not wake up with headaches. Mood is good and had a lot of anxiety that has resolved. She denies emotional lability. She tried Tylenol, Excedrin, sumatriptan (helps and denies side effects). She did not do PT for above  injury. She denies other prior head and neck injury. Prior to current injury, headaches would be once every few weeks and last 2 days and with associated photophobia, phonophobia, nausea sometimes, dizziness and no associated weakness, numbness, tingling, vomiting, change in vision, aura and sometimes would stop ADLs and has menstrual correlate.    Per urgent care note dated 3/2/23, an empty 5 gallon bucket fell 2 feet from above her head and hit her in scalp, denies falling or LOC, has hematoma to frontal area of scalp.    Current Outpatient Medications on File Prior to Visit   Medication Sig Dispense Refill    albuterol (PROVENTIL/VENTOLIN HFA) 90 mcg/actuation inhaler Inhale 1-2 puffs into the lungs every 6 (six) hours as needed for Wheezing. Rescue 8 g 0    albuterol sulfate 2.5 mg/0.5 mL Nebu Take 2.5 mg by nebulization every 4 (four) hours as needed (cough). Rescue 30 each 1    ALPRAZolam (XANAX) 0.5 MG tablet Take 1 tablet (0.5 mg total) by mouth daily as needed for Anxiety. 30 tablet 0    budesonide (PULMICORT) 0.5 mg/2 mL nebulizer solution Take 2 mLs (0.5 mg total) by nebulization once daily. Controller for 10 days 20 mL 0    ferrous gluconate (FERGON) 324 MG tablet Take 324 mg by mouth daily with breakfast.      pantoprazole (PROTONIX) 40 MG tablet Take 1 tablet (40 mg total) by mouth once daily. 30 tablet 2    sumatriptan (IMITREX) 100 MG tablet TAKE ONE TABLET PO ONSET OF MIGRAIN MAX 9 tablet 2    [DISCONTINUED] albuterol (PROVENTIL/VENTOLIN HFA) 90 mcg/actuation inhaler Inhale into the lungs.      [DISCONTINUED] traMADoL (ULTRAM) 50 mg tablet Take 50 mg by mouth.      [DISCONTINUED] amoxicillin (AMOXIL) 500 MG capsule Take by mouth.      [DISCONTINUED] escitalopram oxalate (LEXAPRO) 10 MG tablet Take 1 tablet (10 mg total) by mouth once daily. 30 tablet 1     No current facility-administered medications on file prior to visit.       Review of patient's allergies indicates:   Allergen Reactions     Iodine and iodide containing products     Shellfish containing products     Vicodin [hydrocodone-acetaminophen] Nausea And Vomiting       Family History   Problem Relation Age of Onset    Migraines Mother        Social History     Tobacco Use    Smoking status: Never    Smokeless tobacco: Never   Substance Use Topics    Alcohol use: Yes    Drug use: Not Currently       Review of Systems  Constitutional: No fevers, no chills, no change in weight  Eye/Vision: See HPI  Ear/Nose/Mouth/Throat: See HPI; no cough, no runny nose, no sore throat  Respiratory: No shortness of breath, no problems breathing  Cardiovascular: No chest pain  Gastrointestinal: See HPI, no diarrhea, no constipation  Genitourinary: No dysuria  Musculoskeletal: See HPI  Integumentary: No skin changes  Neurologic: See HPI  Psychiatric: Denies depression, denies anxiety, denies SI and HI.  Additional System Information: (Decreased concentration.)    Objective:     Vitals:    06/20/23 1440   BP: 116/74   Pulse: 92       General: Alert and awake, Well nourished, Well groomed, No acute distress, no photophobia with 60 Hz hypersensitivity.  Eyes: Pupils are equal, round and reactive to light; Extraocular movements are intact; Normal conjunctiva; no nystagmus; Visual fields are intact bilaterally in all cardinal directions; Head thrust negative bilaterally. VOR cancellation WNL  HENT: Normocephalic, Rinne test positive bilaterally, Oral mucosa is moist, No pharyngeal erythema.  Neck: Supple  No Stiffness, Patient has no occipital point tenderness over the lesser occipital nerve right side without induction of headaches: trace right SANDRA only  No high, medial cervical pain with lateral movement of C1 over C2 and with isometric neck flexion and extension  Fluid patient turnaround with concurrent neck movement in direction of torso movement.  Cardiovascular: Normal rate, Regular rhythm, No murmur, No edema; no carotid bruits noted.  Musculoskeletal: No  "swelling.  Spine/torso exam: Spine/ torso exam is within normal limits   Integumentary: Warm, Dry, Intact, No pallor, No rash.    Neurologic Exam  Mental Status: orientated to time, person, and place; good recent and remote memory; attention and concentration WNL; naming intact; adequate fund of knowledge. No aphasia or dysarthria. Repetition intact. Follows complex commands    Cranial Nerves: as above, V1-V3 temperature sensation WNL bilaterally, face symmetric, symmetrical palatal rise, SCM 5/5 bilaterally, tongue protrusion midline and movements WNL  no saccadic intrusions of volitional ocular smooth pursuits  no saccadic dysmetria  no pain with sustained upgaze and convergence  no visual motion sensitivity/dizziness produced with rapid eye movements or neck movements  non-lateralizing Givens tuning fork exam  no convergence insufficiency with no diplopia developed > 5 " accommodation    Muscle Tone/Motor Function: Normal bulk and tone throughout. No drift. Normal rapidly alternating movements. No tremors. No abnormal movements                                                                                                          Right                   Left                                  Deltoid          5/5                      5/5                                  Biceps          5/5                      5/5                                  Triceps         5/5                      5/5                                  Iliopsoas       5/5                     5/5                                  Quadriceps   5/5                     5/5                                  Hamstring     5/5                     5/5                                  Dorsiflexion   5/5                     5/5    Sensory: Vibration sensation WNL x4, Temperature sensation WNL x4, Negative Romberg, no falls on tandem stance    Reflexes: Symmetrical DTR's, Biceps 2+, Brachioradialis 2+, Patellar 2+, No Wartenberg or " Lhermitte    Coordination: No truncal ataxia. Finger to nose WNL bilaterally    Gait: Gait WNL, Heel to toe walking WNL    Labs:  Lab Visit on 05/26/2023   Component Date Value Ref Range Status    WBC 05/26/2023 7.36  3.90 - 12.70 K/uL Final    RBC 05/26/2023 4.31  4.00 - 5.40 M/uL Final    Hemoglobin 05/26/2023 11.0 (L)  12.0 - 16.0 g/dL Final    Hematocrit 05/26/2023 34.5 (L)  37.0 - 48.5 % Final    MCV 05/26/2023 80 (L)  82 - 98 fL Final    MCH 05/26/2023 25.5 (L)  27.0 - 31.0 pg Final    MCHC 05/26/2023 31.9 (L)  32.0 - 36.0 g/dL Final    RDW 05/26/2023 16.7 (H)  11.5 - 14.5 % Final    Platelets 05/26/2023 391  150 - 450 K/uL Final    MPV 05/26/2023 10.0  9.2 - 12.9 fL Final    Immature Granulocytes 05/26/2023 0.4  0.0 - 0.5 % Final    Gran # (ANC) 05/26/2023 4.7  1.8 - 7.7 K/uL Final    Immature Grans (Abs) 05/26/2023 0.03  0.00 - 0.04 K/uL Final    Comment: Mild elevation in immature granulocytes is non specific and   can be seen in a variety of conditions including stress response,   acute inflammation, trauma and pregnancy. Correlation with other   laboratory and clinical findings is essential.      Lymph # 05/26/2023 1.9  1.0 - 4.8 K/uL Final    Mono # 05/26/2023 0.6  0.3 - 1.0 K/uL Final    Eos # 05/26/2023 0.2  0.0 - 0.5 K/uL Final    Baso # 05/26/2023 0.04  0.00 - 0.20 K/uL Final    nRBC 05/26/2023 0  0 /100 WBC Final    Gran % 05/26/2023 63.8  38.0 - 73.0 % Final    Lymph % 05/26/2023 25.5  18.0 - 48.0 % Final    Mono % 05/26/2023 7.6  4.0 - 15.0 % Final    Eosinophil % 05/26/2023 2.2  0.0 - 8.0 % Final    Basophil % 05/26/2023 0.5  0.0 - 1.9 % Final    Differential Method 05/26/2023 Automated   Final    Ferritin 05/26/2023 5 (L)  20.0 - 300.0 ng/mL Final    TSH 05/26/2023 0.890  0.340 - 5.600 uIU/mL Final    Hemoglobin A1C 05/26/2023 5.3  4.5 - 6.2 % Final    Comment: According to ADA guidelines, hemoglobin A1C <7.0% represents  optimal control in non-pregnant diabetic patients.  Different  metrics may  apply to specific populations.   Standards of Medical Care in Diabetes - 2016.    For the purpose of screening for the presence of diabetes:  <5.7%     Consistent with the absence of diabetes  5.7-6.4%  Consistent with increasing risk for diabetes   (prediabetes)  >or=6.5%  Consistent with diabetes    Currently no consensus exists for use of hemoglobin A1C  for diagnosis of diabetes for children.      Estimated Avg Glucose 05/26/2023 105  68 - 131 mg/dL Final    Sodium 05/26/2023 136  136 - 145 mmol/L Final    Potassium 05/26/2023 4.0  3.5 - 5.1 mmol/L Final    Chloride 05/26/2023 107  95 - 110 mmol/L Final    CO2 05/26/2023 24  23 - 29 mmol/L Final    Glucose 05/26/2023 86  70 - 110 mg/dL Final    BUN 05/26/2023 14  6 - 20 mg/dL Final    Creatinine 05/26/2023 0.7  0.5 - 1.4 mg/dL Final    Calcium 05/26/2023 8.4 (L)  8.7 - 10.5 mg/dL Final    Total Protein 05/26/2023 7.0  6.0 - 8.4 g/dL Final    Albumin 05/26/2023 3.8  3.5 - 5.2 g/dL Final    Total Bilirubin 05/26/2023 0.5  0.1 - 1.0 mg/dL Final    Comment: For infants and newborns, interpretation of results should be based  on gestational age, weight and in agreement with clinical  observations.    Premature Infant recommended reference ranges:  Up to 24 hours.............<8.0 mg/dL  Up to 48 hours............<12.0 mg/dL  3-5 days..................<15.0 mg/dL  6-29 days.................<15.0 mg/dL      Alkaline Phosphatase 05/26/2023 60  55 - 135 U/L Final    AST 05/26/2023 16  10 - 40 U/L Final    ALT 05/26/2023 18  10 - 44 U/L Final    Anion Gap 05/26/2023 5 (L)  8 - 16 mmol/L Final    eGFR 05/26/2023 >60.0  >60 mL/min/1.73 m^2 Final    Cholesterol 05/26/2023 176  120 - 199 mg/dL Final    Comment: The National Cholesterol Education Program (NCEP) has set the  following guidelines (reference ranges) for Cholesterol:  Optimal.....................<200 mg/dL  Borderline High.............200-239 mg/dL  High........................> or = 240 mg/dL      Triglycerides  05/26/2023 12 (L)  30 - 150 mg/dL Final    Comment: The National Cholesterol Education Program (NCEP) has set the  following guidelines (reference values) for triglycerides:  Normal......................<150 mg/dL  Borderline High.............150-199 mg/dL  High........................200-499 mg/dL      HDL 05/26/2023 57  40 - 75 mg/dL Final    Comment: The National Cholesterol Education Program (NCEP) has set the  following guidelines (reference values) for HDL Cholesterol:  Low...............<40 mg/dL  Optimal...........>60 mg/dL      LDL Cholesterol 05/26/2023 116.6  63.0 - 159.0 mg/dL Final    Comment: The National Cholesterol Education Program (NCEP) has set the  following guidelines (reference values) for LDL Cholesterol:  Optimal.......................<130 mg/dL  Borderline High...............130-159 mg/dL  High..........................160-189 mg/dL  Very High.....................>190 mg/dL      HDL/Cholesterol Ratio 05/26/2023 32.4  20.0 - 50.0 % Final    Total Cholesterol/HDL Ratio 05/26/2023 3.1  2.0 - 5.0 Final    Non-HDL Cholesterol 05/26/2023 119  mg/dL Final    Comment: Risk category and Non-HDL cholesterol goals:  Coronary heart disease (CHD)or equivalent (10-year risk of CHD >20%):  Non-HDL cholesterol goal     <130 mg/dL  Two or more CHD risk factors and 10-year risk of CHD <= 20%:  Non-HDL cholesterol goal     <160 mg/dL  0 to 1 CHD risk factor:  Non-HDL cholesterol goal     <190 mg/dL     Office Visit on 03/29/2023   Component Date Value Ref Range Status    POC Preg Test, Ur 03/29/2023 Negative  Negative Final     Acceptable 03/29/2023 Yes   Final      I personally reviewed all current labs noted in today's chart.    Imaging:  I personally reviewed all diagnostic images and reports and agree with findings in the radiographical report as noted below unless otherwise specified.    CT Head 4/14/23:  FINDINGS: Noninfusion images were obtained from the skull base to the vertex. There is  no intracranial mass, hemorrhage, or midline shift. Ventricles and sulci are normal. There are no pathologic extra-axial fluid collections. There is no evidence of ischemic change or edema. Cerebellum and brainstem are normal.     The calvarium is intact.     IMPRESSION:     1. Normal CT head without contrast.    My read: No acute intracranial abnormalities    Assessment:       ICD-10-CM ICD-9-CM    1. Concussion without loss of consciousness, sequela  S06.0X0S 907.0       2. Migraine without aura and without status migrainosus, not intractable  G43.009 346.10          37 y.o. female with h/o anxiety, migraine without aura who presents for concussion evaluation. On exam, she has no occipital point tenderness over the lesser occipital nerve right side without induction of headaches. We discussed that there is currently no universally accepted definition of concussion. We discussed that our clinic considers concussion definitive if there is transient disruption of brain activity such as with loss of consciousness or amnesia as it relates to the day of the injury. We discussed typical course, diagnostic findings, and treatment for concussion. We discussed that she is asymptomatic for occipital neuritis despite finding on exam so does not need dedicated treatment at this time. We discussed for her baseline migraines without aura that she only needs abortive therapy at this time and that sumatriptan is a good option. Discussed would stop sumatriptan for family planning or breastfeeding purposes in the future should she do those things.    Plan:     May slowly and progressively increase physical activity as tolerated without restrictions  May do full duty work without restrictions   I can refill sumatriptan in the future if your primary care does not    Abner Yan MD  Sports Neurology

## 2023-06-21 ENCOUNTER — PATIENT MESSAGE (OUTPATIENT)
Dept: ADMINISTRATIVE | Facility: HOSPITAL | Age: 38
End: 2023-06-21
Payer: COMMERCIAL

## 2023-06-22 ENCOUNTER — OFFICE VISIT (OUTPATIENT)
Dept: ALLERGY | Facility: CLINIC | Age: 38
End: 2023-06-22
Payer: COMMERCIAL

## 2023-06-22 VITALS — SYSTOLIC BLOOD PRESSURE: 114 MMHG | HEART RATE: 84 BPM | TEMPERATURE: 97 F | DIASTOLIC BLOOD PRESSURE: 77 MMHG

## 2023-06-22 DIAGNOSIS — Z87.01 HISTORY OF PNEUMONIA: ICD-10-CM

## 2023-06-22 DIAGNOSIS — Z91.013 SHELLFISH ALLERGY: ICD-10-CM

## 2023-06-22 DIAGNOSIS — J31.0 CHRONIC RHINITIS: ICD-10-CM

## 2023-06-22 DIAGNOSIS — L20.84 INTRINSIC ATOPIC DERMATITIS: Primary | ICD-10-CM

## 2023-06-22 DIAGNOSIS — J32.9 RECURRENT SINUSITIS: ICD-10-CM

## 2023-06-22 PROCEDURE — 1159F MED LIST DOCD IN RCRD: CPT | Mod: CPTII,S$GLB,, | Performed by: ALLERGY & IMMUNOLOGY

## 2023-06-22 PROCEDURE — 99205 PR OFFICE/OUTPT VISIT, NEW, LEVL V, 60-74 MIN: ICD-10-PCS | Mod: S$GLB,,, | Performed by: ALLERGY & IMMUNOLOGY

## 2023-06-22 PROCEDURE — 99205 OFFICE O/P NEW HI 60 MIN: CPT | Mod: S$GLB,,, | Performed by: ALLERGY & IMMUNOLOGY

## 2023-06-22 PROCEDURE — 3074F SYST BP LT 130 MM HG: CPT | Mod: CPTII,S$GLB,, | Performed by: ALLERGY & IMMUNOLOGY

## 2023-06-22 PROCEDURE — 3074F PR MOST RECENT SYSTOLIC BLOOD PRESSURE < 130 MM HG: ICD-10-PCS | Mod: CPTII,S$GLB,, | Performed by: ALLERGY & IMMUNOLOGY

## 2023-06-22 PROCEDURE — 3078F PR MOST RECENT DIASTOLIC BLOOD PRESSURE < 80 MM HG: ICD-10-PCS | Mod: CPTII,S$GLB,, | Performed by: ALLERGY & IMMUNOLOGY

## 2023-06-22 PROCEDURE — 3078F DIAST BP <80 MM HG: CPT | Mod: CPTII,S$GLB,, | Performed by: ALLERGY & IMMUNOLOGY

## 2023-06-22 PROCEDURE — 1160F PR REVIEW ALL MEDS BY PRESCRIBER/CLIN PHARMACIST DOCUMENTED: ICD-10-PCS | Mod: CPTII,S$GLB,, | Performed by: ALLERGY & IMMUNOLOGY

## 2023-06-22 PROCEDURE — 3044F PR MOST RECENT HEMOGLOBIN A1C LEVEL <7.0%: ICD-10-PCS | Mod: CPTII,S$GLB,, | Performed by: ALLERGY & IMMUNOLOGY

## 2023-06-22 PROCEDURE — 1159F PR MEDICATION LIST DOCUMENTED IN MEDICAL RECORD: ICD-10-PCS | Mod: CPTII,S$GLB,, | Performed by: ALLERGY & IMMUNOLOGY

## 2023-06-22 PROCEDURE — 1160F RVW MEDS BY RX/DR IN RCRD: CPT | Mod: CPTII,S$GLB,, | Performed by: ALLERGY & IMMUNOLOGY

## 2023-06-22 PROCEDURE — 3044F HG A1C LEVEL LT 7.0%: CPT | Mod: CPTII,S$GLB,, | Performed by: ALLERGY & IMMUNOLOGY

## 2023-06-22 RX ORDER — EPINEPHRINE 0.3 MG/.3ML
1 INJECTION SUBCUTANEOUS ONCE
Qty: 2 EACH | Refills: 3 | Status: SHIPPED | OUTPATIENT
Start: 2023-06-22 | End: 2024-04-03

## 2023-06-22 RX ORDER — IPRATROPIUM BROMIDE 21 UG/1
2 SPRAY, METERED NASAL 4 TIMES DAILY
Qty: 30 ML | Refills: 3 | Status: SHIPPED | OUTPATIENT
Start: 2023-06-22

## 2023-06-22 RX ORDER — BUDESONIDE 0.5 MG/2ML
INHALANT ORAL
Qty: 120 ML | Refills: 3 | Status: SHIPPED | OUTPATIENT
Start: 2023-06-22

## 2023-06-22 RX ORDER — AZELASTINE 1 MG/ML
1 SPRAY, METERED NASAL 2 TIMES DAILY
Qty: 30 ML | Refills: 3 | Status: SHIPPED | OUTPATIENT
Start: 2023-06-22 | End: 2024-06-21

## 2023-06-22 NOTE — PATIENT INSTRUCTIONS
- buy over the counter at any pharmacy or [employer]     Put distilled water into the abad med sinus rinse bottle and stop at the black line.     Then, pour that amount into a microwavable mug, microwave 10 seconds then put into the distilled water back into the bottle.     Then place xlear packet into and mix. The packets that come with the abad med can be used instead or kept for backup if you run out of xlear.      - buy over the counter, can buy online at [employer].        Then place budesonide vial into distilled water and xlear/salt packet mixture.    This will replace the intranasal steroid spray. (flonase/fluticasone, rhinocort, nasacort) unless you want to use both.       - budesonide vials     Breathe in via mouth. Hold breath, squeeze GENTLY ! And blow out via the nose.  Imaging it is like swimming.     The shower is a popular location. Steam can also help open the sinuses.       Budesonide vials Must be used daily for at least 2-4 weeks, or this will not be effective.     Dose:  Use 2 vials daily or 1 vial twice per day.     If you feel worsening symptoms, try doubling the dose x 2 weeks, then back to the baseline or original dose.     If there is a burning sensation, add baking soda to neutralize the ph. Make sure the ratios are mixed correctly.     Tips to avoid unexpected drainage after rinsing     In rare situations, especially if you have had sinus surgery, the saline solution can pool in the sinus cavities and nasal passages and then drip from your nostrils hours after rinsing. To avoid this harmless, but annoying inconvenience, take one extra step after rinsing: lean forward, tilt your head sideways and gently blow your nose. Then, tilt your head to the other side and blow again. You may need to repeat this several times. This will help rid your nasal passages of any excess mucus and remaining saline solution. If you find yourself experiencing delayed drainage often, do not rinse right before  leaving your house or going to bed.    additional nasal spray/sprays if prescribed that can work well with budesonide rinses at the same time:    Azelastine- use as needed for congestion and post nasal drip.   Dose: Use 1 spray per nare every 6 hours.      Ipratropium nasal spray: This is used for a runny nose. This may be used as needed. 1-2 sprays per nare or nostril every 3-6 hours.       Nasal Spray Technique:  Head down  Aim nasal spray tip up and out   Spray DON'T sniff   Let the spray drip out the front  Pat dry and lift head.     Oral antihistamines  Use antihistamines as needed for itching or sneezing.   Daily use can thicken the mucus to where it becomes a glue-like consistency.        Skin testing for foods and inhalants    Instructions For Skin Testing    Please HOLD all antihistamines (Benadryl, zyrtec, Claritin, loratidine, cetirizine, diphenhydramine, medications with pm in the name, Allegra, fexofenadine) 7 days prior to testing.     Please HOLD zantac, ranitidine, pepsid, famotidine 3 days prior to your testing.     Please HOLD azelastine, astelin, astepro, dymista three days prior to your skin testing    Please HOLD your Beta Blocker (ask the office if you are on one.) These medicines typically end in olol. HOLD 48 hours prior to the morning of skin testing.    Please HOLD clonidine the morning of skin testing.     Please HOLD any Tricyclic antidepressants 14 days prior to skin testing. Please consult your prescribing doctor prior to discontinuing this medication.     Please HOLD Seroquel 14 days prior to skin testing. Please consult your prescribing physician prior to stopping this medication.     After skin testing, you may resume taking your HELD medications.     You may CONTINUE Montelukast (singulair), budesonide aqua (rhinocort), budesonide respules (pulmicort) in rinses, Flonase or Fluticasone, Nasonex, Nasocrot, or any other intranasal steroid.     Carry epi pen with you at all times  Get  carrying case  Don't leave in the heat or car

## 2023-06-22 NOTE — LETTER
June 22, 2023        Ge Mathis III, MD  [address]  [address]  [place] [address]             [facility]  [address]  [address]  [place] [address]  Phone: [phone]  Fax: [phone]   Patient: Shalini Stephen   MR Number: [id number]   YOB: 1985   Date of Visit: 6/22/2023       Dear Dr. Mathis:    Thank you for referring Shalini Stephen to me for evaluation. Below are the relevant portions of my assessment and plan of care.            If you have questions, please do not hesitate to call me. I look forward to following Shalini along with you.    Sincerely,      Mirella Chin MD           CC  No Recipients

## 2023-06-22 NOTE — PROGRESS NOTES
Subjective:       Patient ID: Shalini Stephen is a 37 y.o. female.    Chief Complaint: Allergic Rhinitis  (Nasal congestion, no rashes or urticaria. Patient does have asthma and history of shellfish allergy. Discuss allergy testing. )    HPI    Pt presents as a new patient   For food allergy- shellfish, and rhinitis      Food allergy  12-13 yrs of age, oyster lynne at a restaurant  About an hour afterwards  She noted facial swelling  Angioedema only, doesn't recall other cutaneous sx, doesn't recall gi, resp sx    Avoids all shellfish since this occurred  At that time had ed viist    Never had allergy testing     Rhinitis  Onset: childhood  Seasonal: perennial   Sx: congestion , pnd, congestion   Afrin in the past  Flonase hasn't traditionally helped  Afrin worked better , was not consistent with flonase   Not allergy tested  Tried netti pot.   Zyrtec has traditionally helped but not as effective  Claritin is not as effective       History of asthma  Has a history of nebulizer and inhaler- covid related     History of pneumonia November 2022  First episode, immediately after covid   Recurrent sinusitis - 2-3 times per year   After 7 -21 days she will seek care.     Eczema- endorses on forearms  Typically xerotic patches   Mild   Tea tree soap         Review of Systems    General: neg unexpected weight changes, fevers, chills, night sweats, malaise  HEENT: see hpi, Neg eye pain, vision changes, ear drainage, nose bleeds, throat tightness, sores in the mouth  CV: Neg chest pain, palpitations, swelling  Resp: see hpi, neg shortness of breath, hemoptysis, cough  GI: see hpi, neg dysphagia, night abdominal pain, reflux, chronic diarrhea, chronic constipation  Derm: See Hpi, neg new rash, neg flushing  Mu/sk: Neg joint pain, joint swelling   Psych: Neg anxiety  neuro: neg chronic headaches, muscle weakness  Endo: neg heat/cold intolerance, chronic fatigue    Objective:     Vitals:    06/22/23 1047   BP: 114/77   Pulse:  84   Temp: 97.3 °F (36.3 °C)        Physical Exam    General: no acute distress, well developed well nourished   HEENT:   Head:normocephalic atraumatic  Eyes: AIMEE, EOMI, Neg injection, scleral icterus, or conjunctival papillary hypertrophy.  Ears: tm clear bilaterally, normal canal  Nose: 3-4+ inferior turbinates pink, neg nasal polyps            Mucosa: dry             Septal irritation: none   OP: mucus membranes moist, - cobblestoning, + PND, neg erythema or lesions  Neck: supple, Full range of motion, neg lymphadenopathy  Ext:  Neg clubbing, cyanosis, pitting edema  Skin: Neg rashes or lesions    Assessment:       1. Intrinsic atopic dermatitis    2. Chronic rhinitis    3. History of pneumonia    4. Recurrent sinusitis    5. Shellfish allergy        Plan:       Intrinsic atopic dermatitis    Chronic rhinitis    History of pneumonia    Recurrent sinusitis  -     budesonide (PULMICORT) 0.5 mg/2 mL nebulizer solution; 1 vial into sinus rinse twice per day  Dispense: 120 mL; Refill: 3  -     azelastine (ASTELIN) 137 mcg (0.1 %) nasal spray; 1 spray (137 mcg total) by Nasal route 2 (two) times daily.  Dispense: 30 mL; Refill: 3  -     ipratropium (ATROVENT) 21 mcg (0.03 %) nasal spray; 2 sprays by Each Nostril route 4 (four) times daily.  Dispense: 30 mL; Refill: 3    Shellfish allergy  -     EPINEPHrine (EPIPEN) 0.3 mg/0.3 mL AtIn; Inject 0.3 mLs (0.3 mg total) into the muscle once. for 1 dose  Dispense: 2 each; Refill: 3                  Atopic curtis  Ok with moisturizer     Rhiitis  Start sinus rinses bid   Start allegra or zyrtec prn   Azelastine 1 sen q 6 prn   Ipratrpoium prn q 6     Inhlaant skin prick test whole    Shellfish allergy history oyster   Skin prick test shellfish   Epi pen training   Food allergy action plan     Follow up in 6 weeks, jenniffer Chin M.D.  Allergy/Immunology  [facility]   [phone] phone  [phone] fax

## 2023-07-20 RX ORDER — SUMATRIPTAN SUCCINATE 100 MG/1
TABLET ORAL
Qty: 9 TABLET | Refills: 2 | Status: SHIPPED | OUTPATIENT
Start: 2023-07-20 | End: 2023-09-08 | Stop reason: SDUPTHER

## 2023-07-20 NOTE — TELEPHONE ENCOUNTER
No care due was identified.  [facility] Embedded Care Due Messages. Reference number: [id number].   7/19/2023 9:04:50 PM CDT

## 2023-07-28 ENCOUNTER — HOSPITAL ENCOUNTER (OUTPATIENT)
Dept: RADIOLOGY | Facility: HOSPITAL | Age: 38
Discharge: HOME | End: 2023-07-28
Attending: FAMILY MEDICINE
Payer: COMMERCIAL

## 2023-07-28 ENCOUNTER — OFFICE VISIT (OUTPATIENT)
Dept: FAMILY MEDICINE | Facility: CLINIC | Age: 38
End: 2023-07-28
Payer: COMMERCIAL

## 2023-07-28 VITALS
TEMPERATURE: 97 F | HEART RATE: 83 BPM | SYSTOLIC BLOOD PRESSURE: 122 MMHG | BODY MASS INDEX: 42.23 KG/M2 | HEIGHT: 62 IN | OXYGEN SATURATION: 99 % | DIASTOLIC BLOOD PRESSURE: 72 MMHG | WEIGHT: 229.5 LBS

## 2023-07-28 DIAGNOSIS — S80.01XA CONTUSION OF RIGHT KNEE, INITIAL ENCOUNTER: ICD-10-CM
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
## 2023-07-28 DIAGNOSIS — S50.01XA CONTUSION OF RIGHT ELBOW, INITIAL ENCOUNTER: ICD-10-CM

## 2023-07-28 DIAGNOSIS — R79.0 LOW FERRITIN: ICD-10-CM

## 2023-07-28 DIAGNOSIS — S50.01XA CONTUSION OF RIGHT ELBOW, INITIAL ENCOUNTER: Primary | ICD-10-CM

## 2023-07-28 DIAGNOSIS — E66.01 MORBID (SEVERE) OBESITY DUE TO EXCESS CALORIES: ICD-10-CM

## 2023-07-28 PROCEDURE — 1159F PR MEDICATION LIST DOCUMENTED IN MEDICAL RECORD: ICD-10-PCS | Mod: CPTII,S$GLB,, | Performed by: FAMILY MEDICINE

## 2023-07-28 PROCEDURE — 3074F PR MOST RECENT SYSTOLIC BLOOD PRESSURE < 130 MM HG: ICD-10-PCS | Mod: CPTII,S$GLB,, | Performed by: FAMILY MEDICINE

## 2023-07-28 PROCEDURE — 99213 PR OFFICE/OUTPT VISIT, EST, LEVL III, 20-29 MIN: ICD-10-PCS | Mod: S$GLB,,, | Performed by: FAMILY MEDICINE

## 2023-07-28 PROCEDURE — 3044F PR MOST RECENT HEMOGLOBIN A1C LEVEL <7.0%: ICD-10-PCS | Mod: CPTII,S$GLB,, | Performed by: FAMILY MEDICINE

## 2023-07-28 PROCEDURE — 73080 X-RAY EXAM OF ELBOW: CPT | Mod: TC,RT

## 2023-07-28 PROCEDURE — 1159F MED LIST DOCD IN RCRD: CPT | Mod: CPTII,S$GLB,, | Performed by: FAMILY MEDICINE

## 2023-07-28 PROCEDURE — 99213 OFFICE O/P EST LOW 20 MIN: CPT | Mod: S$GLB,,, | Performed by: FAMILY MEDICINE

## 2023-07-28 PROCEDURE — 73564 X-RAY EXAM KNEE 4 OR MORE: CPT | Mod: TC,RT

## 2023-07-28 PROCEDURE — 3078F DIAST BP <80 MM HG: CPT | Mod: CPTII,S$GLB,, | Performed by: FAMILY MEDICINE

## 2023-07-28 PROCEDURE — 1160F RVW MEDS BY RX/DR IN RCRD: CPT | Mod: CPTII,S$GLB,, | Performed by: FAMILY MEDICINE

## 2023-07-28 PROCEDURE — 1160F PR REVIEW ALL MEDS BY PRESCRIBER/CLIN PHARMACIST DOCUMENTED: ICD-10-PCS | Mod: CPTII,S$GLB,, | Performed by: FAMILY MEDICINE

## 2023-07-28 PROCEDURE — 3008F PR BODY MASS INDEX (BMI) DOCUMENTED: ICD-10-PCS | Mod: CPTII,S$GLB,, | Performed by: FAMILY MEDICINE

## 2023-07-28 PROCEDURE — 3074F SYST BP LT 130 MM HG: CPT | Mod: CPTII,S$GLB,, | Performed by: FAMILY MEDICINE

## 2023-07-28 PROCEDURE — 3044F HG A1C LEVEL LT 7.0%: CPT | Mod: CPTII,S$GLB,, | Performed by: FAMILY MEDICINE

## 2023-07-28 PROCEDURE — 3008F BODY MASS INDEX DOCD: CPT | Mod: CPTII,S$GLB,, | Performed by: FAMILY MEDICINE

## 2023-07-28 PROCEDURE — 3078F PR MOST RECENT DIASTOLIC BLOOD PRESSURE < 80 MM HG: ICD-10-PCS | Mod: CPTII,S$GLB,, | Performed by: FAMILY MEDICINE

## 2023-07-30 PROBLEM — E66.01 MORBID (SEVERE) OBESITY DUE TO EXCESS CALORIES: Status: ACTIVE | Noted: 2023-07-30

## 2023-07-30 NOTE — PROGRESS NOTES
Subjective:       Patient ID: Shalini Stephen is a 37 y.o. female.    Chief Complaint: Leg Pain, Arm Pain, and Extremity Weakness    Had a fall 1 month ago.  Landed on the right elbow in the right knee.  On a wood floor.  Knee is okay except for little pain anteriorly.  With the right elbow has been persistently painful.  Some iron deficiency.  Weight is about the same.    Physical examination.  Vital signs noted.  Right elbow slightly tender over the olecranon.  No swelling or bruising.  Good range of motion without significant pain.   is okay.  Interosseous muscles are functioning well.  Pronation and supination are without pain.  Right knee is very slightly tender over tibial tuberosity.  Good range of motion without pain.  No joint effusion.        Objective:        Assessment:       1. Contusion of right elbow, initial encounter    2. Contusion of right knee, initial encounter    3. Low ferritin    4. Fall, initial encounter    5. Morbid (severe) obesity due to excess calories        Plan:       Contusion of right elbow, initial encounter  -     Cancel: X-Ray Elbow 2 Views Right; Future; Expected date: 07/28/2023    Contusion of right knee, initial encounter  -     Cancel: X-Ray Knee 3 View Right; Future; Expected date: 07/28/2023    Low ferritin  -     Ferritin; Future; Expected date: 07/28/2023    Fall, initial encounter    Morbid (severe) obesity due to excess calories    Iron daily.  X-ray right elbow.  X-ray right knee.  Diet weight reduction.

## 2023-07-31 ENCOUNTER — PATIENT MESSAGE (OUTPATIENT)
Dept: RESEARCH | Facility: HOSPITAL | Age: 38
End: 2023-07-31
Payer: COMMERCIAL

## 2023-07-31 ENCOUNTER — TELEPHONE (OUTPATIENT)
Dept: FAMILY MEDICINE | Facility: CLINIC | Age: 38
End: 2023-07-31
Payer: COMMERCIAL

## 2023-07-31 DIAGNOSIS — R79.0 LOW FERRITIN: Primary | ICD-10-CM

## 2023-07-31 DIAGNOSIS — S50.01XA CONTUSION OF RIGHT ELBOW, INITIAL ENCOUNTER: ICD-10-CM

## 2023-08-10 ENCOUNTER — PATIENT MESSAGE (OUTPATIENT)
Dept: ADMINISTRATIVE | Facility: HOSPITAL | Age: 38
End: 2023-08-10
Payer: COMMERCIAL

## 2023-08-14 ENCOUNTER — CLINICAL SUPPORT (OUTPATIENT)
Dept: ALLERGY | Facility: CLINIC | Age: 38
End: 2023-08-14
Payer: COMMERCIAL

## 2023-08-14 ENCOUNTER — TELEPHONE (OUTPATIENT)
Dept: ALLERGY | Facility: CLINIC | Age: 38
End: 2023-08-14

## 2023-08-14 VITALS — BODY MASS INDEX: 42.14 KG/M2 | HEIGHT: 62 IN | OXYGEN SATURATION: 99 % | WEIGHT: 229 LBS

## 2023-08-14 DIAGNOSIS — Z91.013 SHELLFISH ALLERGY: Primary | ICD-10-CM

## 2023-08-14 DIAGNOSIS — K90.49 FOOD INTOLERANCE IN ADULT: ICD-10-CM

## 2023-08-14 DIAGNOSIS — J31.0 CHRONIC RHINITIS: Primary | ICD-10-CM

## 2023-08-14 PROCEDURE — 95004 PR ALLERGY SKIN TESTS,ALLERGENS: ICD-10-PCS | Mod: S$GLB,,, | Performed by: ALLERGY & IMMUNOLOGY

## 2023-08-14 PROCEDURE — 95004 PERQ TESTS W/ALRGNC XTRCS: CPT | Mod: S$GLB,,, | Performed by: ALLERGY & IMMUNOLOGY

## 2023-08-14 NOTE — TELEPHONE ENCOUNTER
Here for skin testing that we rescheduled, inhalants and shellfish, also requested milk and wheat. All negative. Hx of shellfish allergy and has Epi for it, no labs in chart. Can we order those to be done between now and her follow up with you?

## 2023-08-24 ENCOUNTER — OFFICE VISIT (OUTPATIENT)
Dept: FAMILY MEDICINE | Facility: CLINIC | Age: 38
End: 2023-08-24
Payer: COMMERCIAL

## 2023-08-24 VITALS
BODY MASS INDEX: 42.29 KG/M2 | OXYGEN SATURATION: 100 % | WEIGHT: 229.81 LBS | TEMPERATURE: 97 F | SYSTOLIC BLOOD PRESSURE: 118 MMHG | HEART RATE: 83 BPM | HEIGHT: 62 IN | DIASTOLIC BLOOD PRESSURE: 72 MMHG

## 2023-08-24 DIAGNOSIS — R59.0 CERVICAL ADENOPATHY: Primary | ICD-10-CM

## 2023-08-24 PROCEDURE — 3074F PR MOST RECENT SYSTOLIC BLOOD PRESSURE < 130 MM HG: ICD-10-PCS | Mod: CPTII,S$GLB,, | Performed by: FAMILY MEDICINE

## 2023-08-24 PROCEDURE — 3044F PR MOST RECENT HEMOGLOBIN A1C LEVEL <7.0%: ICD-10-PCS | Mod: CPTII,S$GLB,, | Performed by: FAMILY MEDICINE

## 2023-08-24 PROCEDURE — 3074F SYST BP LT 130 MM HG: CPT | Mod: CPTII,S$GLB,, | Performed by: FAMILY MEDICINE

## 2023-08-24 PROCEDURE — 99213 PR OFFICE/OUTPT VISIT, EST, LEVL III, 20-29 MIN: ICD-10-PCS | Mod: S$GLB,,, | Performed by: FAMILY MEDICINE

## 2023-08-24 PROCEDURE — 1159F PR MEDICATION LIST DOCUMENTED IN MEDICAL RECORD: ICD-10-PCS | Mod: CPTII,S$GLB,, | Performed by: FAMILY MEDICINE

## 2023-08-24 PROCEDURE — 3044F HG A1C LEVEL LT 7.0%: CPT | Mod: CPTII,S$GLB,, | Performed by: FAMILY MEDICINE

## 2023-08-24 PROCEDURE — 1160F RVW MEDS BY RX/DR IN RCRD: CPT | Mod: CPTII,S$GLB,, | Performed by: FAMILY MEDICINE

## 2023-08-24 PROCEDURE — 3008F BODY MASS INDEX DOCD: CPT | Mod: CPTII,S$GLB,, | Performed by: FAMILY MEDICINE

## 2023-08-24 PROCEDURE — 99213 OFFICE O/P EST LOW 20 MIN: CPT | Mod: S$GLB,,, | Performed by: FAMILY MEDICINE

## 2023-08-24 PROCEDURE — 3008F PR BODY MASS INDEX (BMI) DOCUMENTED: ICD-10-PCS | Mod: CPTII,S$GLB,, | Performed by: FAMILY MEDICINE

## 2023-08-24 PROCEDURE — 1159F MED LIST DOCD IN RCRD: CPT | Mod: CPTII,S$GLB,, | Performed by: FAMILY MEDICINE

## 2023-08-24 PROCEDURE — 3078F PR MOST RECENT DIASTOLIC BLOOD PRESSURE < 80 MM HG: ICD-10-PCS | Mod: CPTII,S$GLB,, | Performed by: FAMILY MEDICINE

## 2023-08-24 PROCEDURE — 1160F PR REVIEW ALL MEDS BY PRESCRIBER/CLIN PHARMACIST DOCUMENTED: ICD-10-PCS | Mod: CPTII,S$GLB,, | Performed by: FAMILY MEDICINE

## 2023-08-24 PROCEDURE — 3078F DIAST BP <80 MM HG: CPT | Mod: CPTII,S$GLB,, | Performed by: FAMILY MEDICINE

## 2023-08-24 RX ORDER — AMOXICILLIN AND CLAVULANATE POTASSIUM 875; 125 MG/1; MG/1
1 TABLET, FILM COATED ORAL 2 TIMES DAILY
Qty: 20 TABLET | Refills: 0 | Status: SHIPPED | OUTPATIENT
Start: 2023-08-24 | End: 2023-09-27

## 2023-08-25 NOTE — PROGRESS NOTES
Subjective:       Patient ID: Shalini Stephen is a 37 y.o. female.    Chief Complaint: Mass    Woke up with swollen gland in the anterior neck the other day.  Saw dentist.  Lump under the tongue right medial gum.  Somewhat tender.  Referred to oral surgeon.  No drainage.  No fever chills.  No painful teeth.  Did have a sore throat.  Hurts to chew on the right side.  Appointment with oral surgeon on September 1st.    Physical examination.  Vital signs noted.  Pharynx without erythema.  Teeth are okay.  Pain area medial gum low the teeth bulging slightly there.  Not erythematous.  Neck slightly tender anterior over the glans.  But no extremely swollen nodes.        Objective:        Assessment:       1. Cervical adenopathy        Plan:       Cervical adenopathy    Other orders  -     amoxicillin-clavulanate 875-125mg (AUGMENTIN) 875-125 mg per tablet; Take 1 tablet by mouth 2 (two) times a day.  Dispense: 20 tablet; Refill: 0      See oral surgeon as planned.  Go for her Pap smear six-month as planned.  Augmentin 870 b.i.d. for 10 days.

## 2023-08-31 ENCOUNTER — PATIENT MESSAGE (OUTPATIENT)
Dept: ADMINISTRATIVE | Facility: HOSPITAL | Age: 38
End: 2023-08-31
Payer: COMMERCIAL

## 2023-09-08 RX ORDER — SUMATRIPTAN SUCCINATE 100 MG/1
TABLET ORAL
Qty: 9 TABLET | Refills: 2 | Status: SHIPPED | OUTPATIENT
Start: 2023-09-08

## 2023-09-08 NOTE — TELEPHONE ENCOUNTER
No care due was identified.  [facility] Embedded Care Due Messages. Reference number: [id number].   9/08/2023 8:37:28 AM CDT

## 2023-09-08 NOTE — TELEPHONE ENCOUNTER
Spoke with pt she lost med bottle but still had refill on it she said so I told her call pharmacy it was filled at and they can pull it up and fill it for her. If any problems call back here.

## 2023-09-11 ENCOUNTER — OFFICE VISIT (OUTPATIENT)
Dept: FAMILY MEDICINE | Facility: CLINIC | Age: 38
End: 2023-09-11
Payer: COMMERCIAL

## 2023-09-11 ENCOUNTER — HOSPITAL ENCOUNTER (OUTPATIENT)
Dept: RADIOLOGY | Facility: HOSPITAL | Age: 38
Discharge: HOME | End: 2023-09-11
Attending: FAMILY MEDICINE
Payer: COMMERCIAL

## 2023-09-11 ENCOUNTER — TELEPHONE (OUTPATIENT)
Dept: FAMILY MEDICINE | Facility: CLINIC | Age: 38
End: 2023-09-11

## 2023-09-11 VITALS
SYSTOLIC BLOOD PRESSURE: 124 MMHG | TEMPERATURE: 98 F | OXYGEN SATURATION: 100 % | HEIGHT: 62 IN | BODY MASS INDEX: 41.97 KG/M2 | WEIGHT: 228.06 LBS | HEART RATE: 88 BPM | DIASTOLIC BLOOD PRESSURE: 72 MMHG

## 2023-09-11 DIAGNOSIS — R53.83 FATIGUE, UNSPECIFIED TYPE: ICD-10-CM

## 2023-09-11 DIAGNOSIS — R79.89 ELEVATED D-DIMER: ICD-10-CM

## 2023-09-11 DIAGNOSIS — R07.9 CHEST PAIN, UNSPECIFIED TYPE: ICD-10-CM

## 2023-09-11 DIAGNOSIS — D64.9 ANEMIA, UNSPECIFIED TYPE: Primary | ICD-10-CM

## 2023-09-11 DIAGNOSIS — R07.9 RIGHT-SIDED CHEST PAIN: ICD-10-CM

## 2023-09-11 PROCEDURE — 1160F RVW MEDS BY RX/DR IN RCRD: CPT | Mod: CPTII,S$GLB,, | Performed by: FAMILY MEDICINE

## 2023-09-11 PROCEDURE — 3008F BODY MASS INDEX DOCD: CPT | Mod: CPTII,S$GLB,, | Performed by: FAMILY MEDICINE

## 2023-09-11 PROCEDURE — 25500020 PHARM REV CODE 255: Performed by: FAMILY MEDICINE

## 2023-09-11 PROCEDURE — 3044F HG A1C LEVEL LT 7.0%: CPT | Mod: CPTII,S$GLB,, | Performed by: FAMILY MEDICINE

## 2023-09-11 PROCEDURE — 71046 X-RAY EXAM CHEST 2 VIEWS: CPT | Mod: TC

## 2023-09-11 PROCEDURE — 99214 PR OFFICE/OUTPT VISIT, EST, LEVL IV, 30-39 MIN: ICD-10-PCS | Mod: S$GLB,,, | Performed by: FAMILY MEDICINE

## 2023-09-11 PROCEDURE — 3074F SYST BP LT 130 MM HG: CPT | Mod: CPTII,S$GLB,, | Performed by: FAMILY MEDICINE

## 2023-09-11 PROCEDURE — 1159F MED LIST DOCD IN RCRD: CPT | Mod: CPTII,S$GLB,, | Performed by: FAMILY MEDICINE

## 2023-09-11 PROCEDURE — 3044F PR MOST RECENT HEMOGLOBIN A1C LEVEL <7.0%: ICD-10-PCS | Mod: CPTII,S$GLB,, | Performed by: FAMILY MEDICINE

## 2023-09-11 PROCEDURE — 1159F PR MEDICATION LIST DOCUMENTED IN MEDICAL RECORD: ICD-10-PCS | Mod: CPTII,S$GLB,, | Performed by: FAMILY MEDICINE

## 2023-09-11 PROCEDURE — 3078F PR MOST RECENT DIASTOLIC BLOOD PRESSURE < 80 MM HG: ICD-10-PCS | Mod: CPTII,S$GLB,, | Performed by: FAMILY MEDICINE

## 2023-09-11 PROCEDURE — 71275 CT ANGIOGRAPHY CHEST: CPT | Mod: TC

## 2023-09-11 PROCEDURE — 3074F PR MOST RECENT SYSTOLIC BLOOD PRESSURE < 130 MM HG: ICD-10-PCS | Mod: CPTII,S$GLB,, | Performed by: FAMILY MEDICINE

## 2023-09-11 PROCEDURE — 99214 OFFICE O/P EST MOD 30 MIN: CPT | Mod: S$GLB,,, | Performed by: FAMILY MEDICINE

## 2023-09-11 PROCEDURE — 3078F DIAST BP <80 MM HG: CPT | Mod: CPTII,S$GLB,, | Performed by: FAMILY MEDICINE

## 2023-09-11 PROCEDURE — 1160F PR REVIEW ALL MEDS BY PRESCRIBER/CLIN PHARMACIST DOCUMENTED: ICD-10-PCS | Mod: CPTII,S$GLB,, | Performed by: FAMILY MEDICINE

## 2023-09-11 PROCEDURE — 3008F PR BODY MASS INDEX (BMI) DOCUMENTED: ICD-10-PCS | Mod: CPTII,S$GLB,, | Performed by: FAMILY MEDICINE

## 2023-09-11 RX ORDER — AMOXICILLIN 500 MG/1
500 CAPSULE ORAL 3 TIMES DAILY
COMMUNITY
Start: 2023-09-07 | End: 2024-04-03

## 2023-09-11 RX ADMIN — IOHEXOL 100 ML: 350 INJECTION, SOLUTION INTRAVENOUS at 05:09

## 2023-09-11 NOTE — TELEPHONE ENCOUNTER
----- Message from Jhony Gonzalez sent at 9/11/2023  2:25 PM CDT -----  Type: Needs Medical Advice  Who Called:  [facility]/ [facility]     Best Call Back Number: [phone]  Additional Information: Caller states that she would like a callback regarding the patient's CRITICAL LABS.

## 2023-09-12 DIAGNOSIS — M79.601 RIGHT ARM PAIN: Primary | ICD-10-CM

## 2023-09-12 NOTE — PROGRESS NOTES
Subjective:       Patient ID: Shalini Stephen is a 38 y.o. female.    Chief Complaint: Chest Pain    On some antibiotics now had a recent root canal.  Right-sided upper anterior chest pain for 7 days.  Recent oral surgery.  Chest is tender from the outside.  Pain occurs mostly at night lying down.  Very very slight cough no fever no chills no dyspnea.  Some fatigue anemia.  Using ibuprofen p.r.n..  On her iron regularly.    Physical examination.  Vital signs noted.  Neck without bruit no adenopathy.  Chest clear.  Somewhat tender right upper anterior chest.  No skin rash.  Heart regular rate rhythm.  There is no pain with arm or neck movement.  No calf tenderness.        Objective:        Assessment:       1. Anemia, unspecified type    2. Fatigue, unspecified type    3. Right-sided chest pain    4. BMI 40.0-44.9, adult    5. Elevated d-dimer    6. Chest pain, unspecified type        Plan:       Anemia, unspecified type    Fatigue, unspecified type    Right-sided chest pain  -     X-Ray Chest PA And Lateral; Future; Expected date: 09/11/2023  -     D-Dimer, Quantitative; Future; Expected date: 09/11/2023    BMI 40.0-44.9, adult    Elevated d-dimer    Chest pain, unspecified type  -     CTA Chest Non-Coronary (PE Studies); Future; Expected date: 09/11/2023      Get a chest x-ray.  This was negative.  D-dimer done it is slightly elevated.  So CTA of the chest will be done.

## 2023-09-14 ENCOUNTER — PATIENT MESSAGE (OUTPATIENT)
Dept: ADMINISTRATIVE | Facility: HOSPITAL | Age: 38
End: 2023-09-14
Payer: COMMERCIAL

## 2023-09-18 ENCOUNTER — HOSPITAL ENCOUNTER (OUTPATIENT)
Dept: RADIOLOGY | Facility: HOSPITAL | Age: 38
Discharge: HOME | End: 2023-09-18
Attending: FAMILY MEDICINE
Payer: COMMERCIAL

## 2023-09-18 DIAGNOSIS — M79.601 RIGHT ARM PAIN: ICD-10-CM

## 2023-09-18 PROCEDURE — 93971 EXTREMITY STUDY: CPT | Mod: TC,PO,RT

## 2023-09-21 ENCOUNTER — HOSPITAL ENCOUNTER (EMERGENCY)
Facility: HOSPITAL | Age: 38
Discharge: HOME | End: 2023-09-21
Attending: EMERGENCY MEDICINE
Payer: COMMERCIAL

## 2023-09-21 VITALS
DIASTOLIC BLOOD PRESSURE: 73 MMHG | OXYGEN SATURATION: 99 % | HEART RATE: 76 BPM | HEIGHT: 62 IN | SYSTOLIC BLOOD PRESSURE: 120 MMHG | TEMPERATURE: 98 F | WEIGHT: 220 LBS | RESPIRATION RATE: 17 BRPM | BODY MASS INDEX: 40.48 KG/M2

## 2023-09-21 DIAGNOSIS — R07.89 CHEST WALL PAIN: Primary | ICD-10-CM

## 2023-09-21 DIAGNOSIS — F41.9 ANXIETY: ICD-10-CM

## 2023-09-21 DIAGNOSIS — R07.9 CHEST PAIN: ICD-10-CM

## 2023-09-21 LAB
ALBUMIN SERPL BCP-MCNC: 4.1 G/DL (ref 3.5–5.2)
ALP SERPL-CCNC: 64 U/L (ref 55–135)
ALT SERPL W/O P-5'-P-CCNC: 12 U/L (ref 10–44)
ANION GAP SERPL CALC-SCNC: 8 MMOL/L (ref 8–16)
AST SERPL-CCNC: 14 U/L (ref 10–40)
BASOPHILS # BLD AUTO: 0.03 K/UL (ref 0–0.2)
BASOPHILS NFR BLD: 0.3 % (ref 0–1.9)
BILIRUB SERPL-MCNC: 0.1 MG/DL (ref 0.1–1)
BNP SERPL-MCNC: 14 PG/ML (ref 0–99)
BUN SERPL-MCNC: 9 MG/DL (ref 6–20)
CALCIUM SERPL-MCNC: 8.8 MG/DL (ref 8.7–10.5)
CHLORIDE SERPL-SCNC: 104 MMOL/L (ref 95–110)
CO2 SERPL-SCNC: 26 MMOL/L (ref 23–29)
CREAT SERPL-MCNC: 0.6 MG/DL (ref 0.5–1.4)
D DIMER PPP IA.FEU-MCNC: 0.2 MG/L FEU
DIFFERENTIAL METHOD: ABNORMAL
EOSINOPHIL # BLD AUTO: 0.1 K/UL (ref 0–0.5)
EOSINOPHIL NFR BLD: 0.7 % (ref 0–8)
ERYTHROCYTE [DISTWIDTH] IN BLOOD BY AUTOMATED COUNT: 14.3 % (ref 11.5–14.5)
EST. GFR  (NO RACE VARIABLE): >60 ML/MIN/1.73 M^2
GLUCOSE SERPL-MCNC: 91 MG/DL (ref 70–110)
HCT VFR BLD AUTO: 35.2 % (ref 37–48.5)
HGB BLD-MCNC: 11.6 G/DL (ref 12–16)
IMM GRANULOCYTES # BLD AUTO: 0.03 K/UL (ref 0–0.04)
IMM GRANULOCYTES NFR BLD AUTO: 0.3 % (ref 0–0.5)
LYMPHOCYTES # BLD AUTO: 1.7 K/UL (ref 1–4.8)
LYMPHOCYTES NFR BLD: 19.3 % (ref 18–48)
MAGNESIUM SERPL-MCNC: 1.7 MG/DL (ref 1.6–2.6)
MCH RBC QN AUTO: 27.7 PG (ref 27–31)
MCHC RBC AUTO-ENTMCNC: 33 G/DL (ref 32–36)
MCV RBC AUTO: 84 FL (ref 82–98)
MONOCYTES # BLD AUTO: 0.7 K/UL (ref 0.3–1)
MONOCYTES NFR BLD: 8 % (ref 4–15)
NEUTROPHILS # BLD AUTO: 6.2 K/UL (ref 1.8–7.7)
NEUTROPHILS NFR BLD: 71.4 % (ref 38–73)
NRBC BLD-RTO: 0 /100 WBC
PLATELET # BLD AUTO: 431 K/UL (ref 150–450)
PMV BLD AUTO: 10.4 FL (ref 9.2–12.9)
POTASSIUM SERPL-SCNC: 3.7 MMOL/L (ref 3.5–5.1)
PROT SERPL-MCNC: 7.2 G/DL (ref 6–8.4)
RBC # BLD AUTO: 4.19 M/UL (ref 4–5.4)
SODIUM SERPL-SCNC: 138 MMOL/L (ref 136–145)
T4 FREE SERPL-MCNC: 1.04 NG/DL (ref 0.71–1.51)
TROPONIN I SERPL HS-MCNC: <2.3 PG/ML (ref 0–14.9)
TSH SERPL DL<=0.005 MIU/L-ACNC: 0.55 UIU/ML (ref 0.34–5.6)
WBC # BLD AUTO: 8.64 K/UL (ref 3.9–12.7)

## 2023-09-21 PROCEDURE — 93010 EKG 12-LEAD: ICD-10-PCS | Mod: ,,, | Performed by: INTERNAL MEDICINE

## 2023-09-21 PROCEDURE — 85025 COMPLETE CBC W/AUTO DIFF WBC: CPT | Performed by: EMERGENCY MEDICINE

## 2023-09-21 PROCEDURE — 99284 EMERGENCY DEPT VISIT MOD MDM: CPT | Mod: 25

## 2023-09-21 PROCEDURE — 84443 ASSAY THYROID STIM HORMONE: CPT | Performed by: EMERGENCY MEDICINE

## 2023-09-21 PROCEDURE — 84484 ASSAY OF TROPONIN QUANT: CPT | Performed by: EMERGENCY MEDICINE

## 2023-09-21 PROCEDURE — 80053 COMPREHEN METABOLIC PANEL: CPT | Performed by: EMERGENCY MEDICINE

## 2023-09-21 PROCEDURE — 84439 ASSAY OF FREE THYROXINE: CPT | Performed by: EMERGENCY MEDICINE

## 2023-09-21 PROCEDURE — 85379 FIBRIN DEGRADATION QUANT: CPT | Performed by: EMERGENCY MEDICINE

## 2023-09-21 PROCEDURE — 83880 ASSAY OF NATRIURETIC PEPTIDE: CPT | Performed by: EMERGENCY MEDICINE

## 2023-09-21 PROCEDURE — 83735 ASSAY OF MAGNESIUM: CPT | Performed by: EMERGENCY MEDICINE

## 2023-09-21 PROCEDURE — 93005 ELECTROCARDIOGRAM TRACING: CPT | Performed by: INTERNAL MEDICINE

## 2023-09-21 PROCEDURE — 93010 ELECTROCARDIOGRAM REPORT: CPT | Mod: ,,, | Performed by: INTERNAL MEDICINE

## 2023-09-21 NOTE — ED PROVIDER NOTES
Encounter Date: 9/21/2023       History     Chief Complaint   Patient presents with    Chest Pain    Palpitations     HPI patient is a 38-year-old woman with a history of asthma, anemia, anxiety who presents emergency department complaining of several weeks of intermittent right-sided chest pain and palpitations.  Pain is worse with palpation or certain movements and wakes her up at night.  She has been seeing her primary care doctor who currently has her on Xanax.  She has no family history MIs.  No history of hypertension, diabetes or tobacco abuse.  Significant other produces additional history that she recently had dental procedure for a dental infection.  Review of patient's allergies indicates:   Allergen Reactions    Shellfish containing products     Vicodin [hydrocodone-acetaminophen] Nausea And Vomiting     Past Medical History:   Diagnosis Date    Allergy     Anemia, unspecified     Asthma      No past surgical history on file.  Family History   Problem Relation Age of Onset    Migraines Mother      Social History     Tobacco Use    Smoking status: Never    Smokeless tobacco: Never   Substance Use Topics    Alcohol use: Yes    Drug use: Not Currently     Review of Systems   Constitutional:  Negative for fever.   HENT:  Negative for sore throat.    Respiratory:  Negative for shortness of breath.    Cardiovascular:  Positive for chest pain and palpitations.   Gastrointestinal:  Negative for nausea.   Genitourinary:  Negative for dysuria.   Musculoskeletal:  Negative for back pain.   Skin:  Negative for rash.   Neurological:  Negative for weakness.   Hematological:  Does not bruise/bleed easily.   Psychiatric/Behavioral:  The patient is nervous/anxious.        Physical Exam     Initial Vitals [09/21/23 1204]   BP Pulse Resp Temp SpO2   (!) 153/98 (!) 111 20 98.5 °F (36.9 °C) 100 %      MAP       --         Physical Exam    Constitutional: Vital signs are normal. She appears well-developed and well-nourished.   Non-toxic appearance. No distress.   HENT:   Head: Normocephalic and atraumatic.   Eyes: EOM are normal. Pupils are equal, round, and reactive to light.   Neck: Neck supple. No JVD present.   Normal range of motion.  Cardiovascular:  Normal rate, regular rhythm, normal heart sounds and intact distal pulses.     Exam reveals no gallop and no friction rub.       No murmur heard.  Pulmonary/Chest: Breath sounds normal. She has no wheezes. She has no rhonchi. She has no rales. She exhibits tenderness (reproducible right upper chest wall tenderness.  No overlying erythema, induration or rash.).   Abdominal: Abdomen is soft. Bowel sounds are normal. There is no abdominal tenderness. There is no rebound and no guarding.   Musculoskeletal:         General: Normal range of motion.      Cervical back: Normal range of motion and neck supple. No rigidity.     Neurological: She is alert and oriented to person, place, and time. She has normal strength and normal reflexes. No cranial nerve deficit or sensory deficit. She exhibits normal muscle tone. Coordination normal. GCS eye subscore is 4. GCS verbal subscore is 5. GCS motor subscore is 6.   Skin: Skin is warm and dry.   Psychiatric: She has a normal mood and affect. Her speech is normal and behavior is normal. She is not actively hallucinating.         ED Course   Procedures  Labs Reviewed   CBC W/ AUTO DIFFERENTIAL - Abnormal; Notable for the following components:       Result Value    Hemoglobin 11.6 (*)     Hematocrit 35.2 (*)     All other components within normal limits   MAGNESIUM   COMPREHENSIVE METABOLIC PANEL   TROPONIN I HIGH SENSITIVITY   B-TYPE NATRIURETIC PEPTIDE   D DIMER, QUANTITATIVE   TSH   T4, FREE        ECG Results              EKG 12-lead (In process)  Result time 09/21/23 13:12:07      In process by Interface, Lab In [facility] (09/21/23 13:12:07)                   Narrative:    Test Reason : R07.9,    Vent. Rate : 084 BPM     Atrial Rate : 084 BPM     P-R  Int : 150 ms          QRS Dur : 070 ms      QT Int : 360 ms       P-R-T Axes : 013 060 057 degrees     QTc Int : 425 ms    Normal sinus rhythm  Nonspecific T wave abnormality  Abnormal ECG  When compared with ECG of 04-DEC-2022 11:24,  No significant change was found    Referred By: [email or web address]   SELF           Confirmed By:                                   Imaging Results              X-Ray Chest PA And Lateral (Final result)  Result time 09/21/23 15:47:54   Procedure changed from X-Ray Chest AP Portable     Final result by Ivan Quevedo MD (09/21/23 15:47:54)                   Narrative:    HISTORY: Chest Pain    FINDINGS: Two view chest radiograph at 1502 hours compared to prior exams show the trachea is midline, with the cardiomediastinal silhouette and pulmonary vascular distribution within normal limits.    The lungs are normally and symmetrically expanded, with no consolidation, pleural effusion or evidence of pulmonary edema. No confluent infiltrates or pneumothorax. There are no significant osseous abnormalities.    IMPRESSION: No evidence of active cardiopulmonary disease.    Electronically signed by:  Ivan Quevedo MD  9/21/2023 3:47 PM CDT Workstation: [id number]                                     Medications - No data to display  Medical Decision Making  This is an emergent evaluation for a patient with chest pain. .The patient's pain is atypical for cardiac etiology.  I decided to obtain and review the patient's past medical record.        The vital signs are stable in the room.    EKG is normal.  There is no evidence of STEMI or ischemia.    CXR is negative for pneumonia, pneumothorax and edema.  Troponin is negative and was drawn at least 8 hours since the onset of pain.  I doubt ACS.  BNP is negative.  There is no evidence of congestive heart failure.  The electrolytes are relatively normal.  The pt is not anemic.  DDIMER is negative.  I doubt PE.       Currently the patient has a a non-diagnostic  EKG with negative troponin in the emergency department.  I doubt acute coronary syndrome.  I did inform the patient that even with negative testing, we can never eliminate all risk.  I believe the patient is low risk with negative initial testing; Heart SCORE 1.  I have low suspicion for cardiopulmonary, vascular, infectious, respiratory, or other emergent medical condition based on my evaluation in the ED.     Pain is completely reproducible on examination and patient does have history of anxiety, this is likely costochondritis.      The results and physical exam findings were reviewed with the patient. Pt agrees with assessment, disposition and treatment plan and has no further questions or complaints at this time.      Stas Martins M.D. 10:02 PM 9/21/2023                                       Clinical Impression:   Final diagnoses:  [R07.9] Chest pain  [R07.89] Chest wall pain (Primary)  [F41.9] Anxiety        ED Disposition Condition    Discharge Stable          ED Prescriptions    None       Follow-up Information       Follow up With Specialties Details Why Contact Info Additional Information    Ge Mathis III, MD Family Medicine   [address]  [address]  [place] [address]  [phone]       [facility] - Emergency Dept Emergency Medicine  As needed, If symptoms worsen [address] [address]  [phone] 1st floor             Stas Martins MD  09/21/23 [id number]

## 2023-09-21 NOTE — ED NOTES
"PRIVATE ROOM. EVEN AND NON LABORED RESPIRATIONS.  AIRWAY CLEAR.  PULSES REGULAR.  < 3" CAPILLARY REFILL. SKIN WDI.  MAEW.  NON DISTENDED ABDOMEN. ALERT, ORIENTED AND AMBULATORY. NAD AT THIS TIME.  CALL LIGHT IN REACH.   "

## 2023-09-27 ENCOUNTER — OFFICE VISIT (OUTPATIENT)
Dept: CARDIOLOGY | Facility: CLINIC | Age: 38
End: 2023-09-27
Payer: COMMERCIAL

## 2023-09-27 VITALS
BODY MASS INDEX: 41.41 KG/M2 | HEART RATE: 81 BPM | HEIGHT: 62 IN | DIASTOLIC BLOOD PRESSURE: 64 MMHG | RESPIRATION RATE: 16 BRPM | OXYGEN SATURATION: 98 % | WEIGHT: 225 LBS | SYSTOLIC BLOOD PRESSURE: 110 MMHG

## 2023-09-27 DIAGNOSIS — R07.9 CHEST PAIN, UNSPECIFIED TYPE: ICD-10-CM

## 2023-09-27 DIAGNOSIS — R00.2 PALPITATIONS: Primary | ICD-10-CM

## 2023-09-27 PROCEDURE — 3078F PR MOST RECENT DIASTOLIC BLOOD PRESSURE < 80 MM HG: ICD-10-PCS | Mod: CPTII,S$GLB,, | Performed by: INTERNAL MEDICINE

## 2023-09-27 PROCEDURE — 99999 PR PBB SHADOW E&M-EST. PATIENT-LVL IV: ICD-10-PCS | Mod: PBBFAC,,, | Performed by: INTERNAL MEDICINE

## 2023-09-27 PROCEDURE — 3044F HG A1C LEVEL LT 7.0%: CPT | Mod: CPTII,S$GLB,, | Performed by: INTERNAL MEDICINE

## 2023-09-27 PROCEDURE — 99204 PR OFFICE/OUTPT VISIT, NEW, LEVL IV, 45-59 MIN: ICD-10-PCS | Mod: S$GLB,,, | Performed by: INTERNAL MEDICINE

## 2023-09-27 PROCEDURE — 3074F SYST BP LT 130 MM HG: CPT | Mod: CPTII,S$GLB,, | Performed by: INTERNAL MEDICINE

## 2023-09-27 PROCEDURE — 99204 OFFICE O/P NEW MOD 45 MIN: CPT | Mod: S$GLB,,, | Performed by: INTERNAL MEDICINE

## 2023-09-27 PROCEDURE — 3078F DIAST BP <80 MM HG: CPT | Mod: CPTII,S$GLB,, | Performed by: INTERNAL MEDICINE

## 2023-09-27 PROCEDURE — 3074F PR MOST RECENT SYSTOLIC BLOOD PRESSURE < 130 MM HG: ICD-10-PCS | Mod: CPTII,S$GLB,, | Performed by: INTERNAL MEDICINE

## 2023-09-27 PROCEDURE — 1159F MED LIST DOCD IN RCRD: CPT | Mod: CPTII,S$GLB,, | Performed by: INTERNAL MEDICINE

## 2023-09-27 PROCEDURE — 3044F PR MOST RECENT HEMOGLOBIN A1C LEVEL <7.0%: ICD-10-PCS | Mod: CPTII,S$GLB,, | Performed by: INTERNAL MEDICINE

## 2023-09-27 PROCEDURE — 3008F PR BODY MASS INDEX (BMI) DOCUMENTED: ICD-10-PCS | Mod: CPTII,S$GLB,, | Performed by: INTERNAL MEDICINE

## 2023-09-27 PROCEDURE — 1159F PR MEDICATION LIST DOCUMENTED IN MEDICAL RECORD: ICD-10-PCS | Mod: CPTII,S$GLB,, | Performed by: INTERNAL MEDICINE

## 2023-09-27 PROCEDURE — 99999 PR PBB SHADOW E&M-EST. PATIENT-LVL IV: CPT | Mod: PBBFAC,,, | Performed by: INTERNAL MEDICINE

## 2023-09-27 PROCEDURE — 3008F BODY MASS INDEX DOCD: CPT | Mod: CPTII,S$GLB,, | Performed by: INTERNAL MEDICINE

## 2023-09-27 NOTE — PROGRESS NOTES
[facility]    Subjective:     Patient ID:  Shalini Stephen is a 38 y.o. female patient here for evaluation Establish Care (Seen in the hospital, she has been having right sided chest pain, sob, history of asthma. Palpitations )      HPI:  38-year-old female here for post hospital discharge for chest pain and palpitations.  Reports that she was diagnosed with costochondritis.  Reports chest pain is better.  Does not report any family history of premature coronary artery disease.  Does not report any worsening or chest discomfort with exertion.  Reports occasional palpitations especially at night.  Troponin and BNP were normal.    Review of Systems   All other systems reviewed and are negative.       Past Medical History:   Diagnosis Date    Allergy     Anemia, unspecified     Asthma        History reviewed. No pertinent surgical history.    Family History   Problem Relation Age of Onset    Migraines Mother        Social History     Socioeconomic History    Marital status:    Tobacco Use    Smoking status: Never    Smokeless tobacco: Never   Substance and Sexual Activity    Alcohol use: Yes    Drug use: Not Currently    Sexual activity: Yes     Partners: Male     Social Determinants of Health     Stress: No Stress Concern Present (6/24/2020)    [employer] - Occupational Stress Questionnaire     Feeling of Stress : Not at all       Current Outpatient Medications   Medication Sig Dispense Refill    ALPRAZolam (XANAX) 0.5 MG tablet Take 1 tablet (0.5 mg total) by mouth daily as needed for Anxiety. 30 tablet 0    azelastine (ASTELIN) 137 mcg (0.1 %) nasal spray 1 spray (137 mcg total) by Nasal route 2 (two) times daily. 30 mL 3    ferrous gluconate (FERGON) 324 MG tablet Take 324 mg by mouth daily with breakfast.      albuterol (PROVENTIL/VENTOLIN HFA) 90 mcg/actuation inhaler Inhale 1-2 puffs into the lungs every 6 (six)  hours as needed for Wheezing. Rescue 8 g 0    albuterol sulfate 2.5 mg/0.5 mL Nebu Take 2.5 mg by nebulization every 4 (four) hours as needed (cough). Rescue 30 each 1    amoxicillin (AMOXIL) 500 MG capsule Take 500 mg by mouth 3 (three) times daily.      budesonide (PULMICORT) 0.5 mg/2 mL nebulizer solution 1 vial into sinus rinse twice per day 120 mL 3    EPINEPHrine (EPIPEN) 0.3 mg/0.3 mL AtIn Inject 0.3 mLs (0.3 mg total) into the muscle once. for 1 dose 2 each 3    ipratropium (ATROVENT) 21 mcg (0.03 %) nasal spray 2 sprays by Each Nostril route 4 (four) times daily. 30 mL 3    pantoprazole (PROTONIX) 40 MG tablet Take 1 tablet (40 mg total) by mouth once daily. (Patient taking differently: Take 40 mg by mouth once daily. Has not recieved) 30 tablet 2    sumatriptan (IMITREX) 100 MG tablet TAKE ONE TABLET PO ONSET OF MIGRAIN MAX 9 tablet 2     No current facility-administered medications for this visit.       Review of patient's allergies indicates:   Allergen Reactions    Shellfish containing products     Vicodin [hydrocodone-acetaminophen] Nausea And Vomiting         Objective:        Vitals:    09/27/23 1558   BP: 110/64   Pulse: 81   Resp: 16       Physical Exam  Vitals reviewed.   Constitutional:       Appearance: Normal appearance.   HENT:      Mouth/Throat:      Mouth: Mucous membranes are moist.   Eyes:      Pupils: Pupils are equal, round, and reactive to light.   Cardiovascular:      Rate and Rhythm: Normal rate and regular rhythm.      Pulses: Normal pulses.      Heart sounds: Normal heart sounds. No murmur heard.     No gallop.   Pulmonary:      Effort: Pulmonary effort is normal.      Breath sounds: Normal breath sounds.   Abdominal:      General: Bowel sounds are normal.      Palpations: Abdomen is soft.   Musculoskeletal:         General: Normal range of motion.   Skin:     General: Skin is warm and dry.   Neurological:      General: No focal deficit present.      Mental Status: She is alert and  oriented to person, place, and time.   Psychiatric:         Mood and Affect: Mood normal.         LIPIDS - LAST 2   Lab Results   Component Value Date    CHOL 176 05/26/2023    CHOL 193 06/24/2020    HDL 57 05/26/2023    HDL 63 06/24/2020    LDLCALC 116.6 05/26/2023    LDLCALC 125.4 06/24/2020    TRIG 12 (L) 05/26/2023    TRIG 23 (L) 06/24/2020    CHOLHDL 32.4 05/26/2023    CHOLHDL 32.6 06/24/2020       CBC - LAST 2  Lab Results   Component Value Date    WBC 8.64 09/21/2023    WBC 7.36 05/26/2023    RBC 4.19 09/21/2023    RBC 4.31 05/26/2023    HGB 11.6 (L) 09/21/2023    HGB 11.0 (L) 05/26/2023    HCT 35.2 (L) 09/21/2023    HCT 34.5 (L) 05/26/2023    MCV 84 09/21/2023    MCV 80 (L) 05/26/2023    MCH 27.7 09/21/2023    MCH 25.5 (L) 05/26/2023    MCHC 33.0 09/21/2023    MCHC 31.9 (L) 05/26/2023    RDW 14.3 09/21/2023    RDW 16.7 (H) 05/26/2023     09/21/2023     05/26/2023    MPV 10.4 09/21/2023    MPV 10.0 05/26/2023    GRAN 6.2 09/21/2023    GRAN 71.4 09/21/2023    LYMPH 1.7 09/21/2023    LYMPH 19.3 09/21/2023    MONO 0.7 09/21/2023    MONO 8.0 09/21/2023    BASO 0.03 09/21/2023    BASO 0.04 05/26/2023    NRBC 0 09/21/2023    NRBC 0 05/26/2023       CHEMISTRY & LIVER FUNCTION - LAST 2  Lab Results   Component Value Date     09/21/2023     05/26/2023    K 3.7 09/21/2023    K 4.0 05/26/2023     09/21/2023     05/26/2023    CO2 26 09/21/2023    CO2 24 05/26/2023    ANIONGAP 8 09/21/2023    ANIONGAP 5 (L) 05/26/2023    BUN 9 09/21/2023    BUN 14 05/26/2023    CREATININE 0.6 09/21/2023    CREATININE 0.7 05/26/2023    GLU 91 09/21/2023    GLU 86 05/26/2023    CALCIUM 8.8 09/21/2023    CALCIUM 8.4 (L) 05/26/2023    MG 1.7 09/21/2023    ALBUMIN 4.1 09/21/2023    ALBUMIN 3.8 05/26/2023    PROT 7.2 09/21/2023    PROT 7.0 05/26/2023    ALKPHOS 64 09/21/2023    ALKPHOS 60 05/26/2023    ALT 12 09/21/2023    ALT 18 05/26/2023    AST 14 09/21/2023    AST 16 05/26/2023    BILITOT 0.1 09/21/2023  "   BILITOT 0.5 05/26/2023        CARDIAC PROFILE - LAST 2  Lab Results   Component Value Date    BNP 14 09/21/2023    BNP 51 07/14/2020        COAGULATION - LAST 2  No results found for: "LABPT", "INR", "APTT"    ENDOCRINE & PSA - LAST 2  Lab Results   Component Value Date    HGBA1C 5.3 05/26/2023    TSH 0.552 09/21/2023    TSH 0.890 05/26/2023        ECHOCARDIOGRAM RESULTS  No results found for this or any previous visit.      CURRENT/PREVIOUS VISIT EKG  Results for orders placed or performed during the hospital encounter of 09/21/23   EKG 12-lead    Collection Time: 09/21/23 12:13 PM    Narrative    Test Reason : R07.9,    Vent. Rate : 084 BPM     Atrial Rate : 084 BPM     P-R Int : 150 ms          QRS Dur : 070 ms      QT Int : 360 ms       P-R-T Axes : 013 060 057 degrees     QTc Int : 425 ms    Normal sinus rhythm  Nonspecific T wave abnormality  Abnormal ECG    Confirmed by Alcon CUI, Titi Ruggiero ([id number]) on 9/21/2023 10:45:53 PM    Referred By: BASILIO   SELF           Confirmed By:Titi Rondon MD     No valid procedures specified.   No results found for this or any previous visit.    No valid procedures specified.        Assessment:       1. Palpitations    2. Chest pain           Plan:       Palpitations  -     Echo; Future  -     Exercise Stress - EKG; Future  -     Cardiac Monitor - 3-15 Day Adult (Cupid Only); Future    Chest pain  -     Ambulatory referral/consult to Cardiology  -     Echo; Future  -     Exercise Stress - EKG; Future  -     Cardiac Monitor - 3-15 Day Adult (Cupid Only); Future    Symptoms do not appear cardiac in etiology but will evaluate further with the echo, stress test and monitor.  Patient instructed to maintain good hydration status and a moderate caffeine intake.  Continue with regular physical exercise.  Patient to follow-up in 4 weeks to go over the results of the test.    Follow up in about 4 weeks (around 10/25/2023) for f/u NP for  echo, stress test, monitor.    "       Pepe Escobar MD  [facility]

## 2023-10-06 ENCOUNTER — TELEPHONE (OUTPATIENT)
Dept: CARDIOLOGY | Facility: HOSPITAL | Age: 38
End: 2023-10-06

## 2023-10-06 NOTE — TELEPHONE ENCOUNTER
Regular stress that you talk to:  Patient advised, test will be at [facility] ([address]  Will need to register on the first floor at the main entrance.  Patient advised that arrival time is 9:30.  Patient advised, may take her medications prior to testing if you need to.  . Advised if she needs to eat to take her medications, please keep it light, like toast and juice.    Patient advised to avoid all caffeine 12 hours prior to testing.  This includes decaf tea and coffee.    Wear comfortable clothing.  No lotions, oils, or powders to the upper chest area. May wear deodorant.    Patient verbalizes understanding of instructions.

## 2023-10-09 ENCOUNTER — HOSPITAL ENCOUNTER (OUTPATIENT)
Dept: CARDIOLOGY | Facility: HOSPITAL | Age: 38
Discharge: HOME | End: 2023-10-09
Attending: INTERNAL MEDICINE
Payer: COMMERCIAL

## 2023-10-09 DIAGNOSIS — R00.2 PALPITATIONS: ICD-10-CM

## 2023-10-09 DIAGNOSIS — R07.9 CHEST PAIN, UNSPECIFIED TYPE: ICD-10-CM

## 2023-10-09 PROCEDURE — 93016 EXERCISE STRESS - EKG (CUPID ONLY): ICD-10-PCS | Mod: ,,, | Performed by: NURSE PRACTITIONER

## 2023-10-09 PROCEDURE — 93018 EXERCISE STRESS - EKG (CUPID ONLY): ICD-10-PCS | Mod: ,,, | Performed by: INTERNAL MEDICINE

## 2023-10-09 PROCEDURE — 93016 CV STRESS TEST SUPVJ ONLY: CPT | Mod: ,,, | Performed by: NURSE PRACTITIONER

## 2023-10-09 PROCEDURE — 93017 CV STRESS TEST TRACING ONLY: CPT

## 2023-10-09 PROCEDURE — 93018 CV STRESS TEST I&R ONLY: CPT | Mod: ,,, | Performed by: INTERNAL MEDICINE

## 2023-10-10 LAB
CV STRESS BASE HR: 91 BPM
DIASTOLIC BLOOD PRESSURE: 70 MMHG
OHS CV CPX 1 MINUTE RECOVERY HEART RATE: 150 BPM
OHS CV CPX 85 PERCENT MAX PREDICTED HEART RATE MALE: 147
OHS CV CPX ESTIMATED METS: 7
OHS CV CPX MAX PREDICTED HEART RATE: 173
OHS CV CPX PATIENT IS FEMALE: 1
OHS CV CPX PATIENT IS MALE: 0
OHS CV CPX PEAK DIASTOLIC BLOOD PRESSURE: 90 MMHG
OHS CV CPX PEAK HEAR RATE: 169 BPM
OHS CV CPX PEAK RATE PRESSURE PRODUCT: NORMAL
OHS CV CPX PEAK SYSTOLIC BLOOD PRESSURE: 180 MMHG
OHS CV CPX PERCENT MAX PREDICTED HEART RATE ACHIEVED: 97
OHS CV CPX RATE PRESSURE PRODUCT PRESENTING: NORMAL
STRESS ECHO POST EXERCISE DUR MIN: 6 MINUTES
STRESS ECHO POST EXERCISE DUR SEC: 0 SECONDS
SYSTOLIC BLOOD PRESSURE: 115 MMHG

## 2023-10-11 ENCOUNTER — PATIENT MESSAGE (OUTPATIENT)
Dept: FAMILY MEDICINE | Facility: CLINIC | Age: 38
End: 2023-10-11

## 2023-10-31 ENCOUNTER — HOSPITAL ENCOUNTER (OUTPATIENT)
Dept: CARDIOLOGY | Facility: HOSPITAL | Age: 38
Discharge: HOME | End: 2023-10-31
Attending: INTERNAL MEDICINE
Payer: COMMERCIAL

## 2023-10-31 VITALS — WEIGHT: 225.06 LBS | HEIGHT: 62 IN | BODY MASS INDEX: 41.42 KG/M2

## 2023-10-31 DIAGNOSIS — R07.9 CHEST PAIN, UNSPECIFIED TYPE: ICD-10-CM

## 2023-10-31 DIAGNOSIS — R00.2 PALPITATIONS: ICD-10-CM

## 2023-10-31 PROCEDURE — 93306 TTE W/DOPPLER COMPLETE: CPT

## 2023-10-31 PROCEDURE — 93306 TTE W/DOPPLER COMPLETE: CPT | Mod: 26,,, | Performed by: GENERAL PRACTICE

## 2023-10-31 PROCEDURE — 93306 ECHO (CUPID ONLY): ICD-10-PCS | Mod: 26,,, | Performed by: GENERAL PRACTICE

## 2023-11-01 ENCOUNTER — PATIENT MESSAGE (OUTPATIENT)
Dept: ADMINISTRATIVE | Facility: HOSPITAL | Age: 38
End: 2023-11-01
Payer: COMMERCIAL

## 2023-11-03 ENCOUNTER — OFFICE VISIT (OUTPATIENT)
Dept: FAMILY MEDICINE | Facility: CLINIC | Age: 38
End: 2023-11-03
Payer: COMMERCIAL

## 2023-11-03 VITALS
WEIGHT: 227.19 LBS | HEIGHT: 62 IN | SYSTOLIC BLOOD PRESSURE: 124 MMHG | TEMPERATURE: 98 F | HEART RATE: 82 BPM | DIASTOLIC BLOOD PRESSURE: 70 MMHG | BODY MASS INDEX: 41.81 KG/M2 | OXYGEN SATURATION: 99 %

## 2023-11-03 DIAGNOSIS — D64.9 ANEMIA, UNSPECIFIED TYPE: ICD-10-CM

## 2023-11-03 DIAGNOSIS — F41.9 ANXIETY: Primary | ICD-10-CM

## 2023-11-03 LAB
AORTIC ROOT ANNULUS: 2.9 CM
AORTIC VALVE CUSP SEPERATION: 2.1 CM
AV INDEX (PROSTH): 0.88
AV MEAN GRADIENT: 4 MMHG
AV PEAK GRADIENT: 7 MMHG
AV VALVE AREA BY VELOCITY RATIO: 3.11 CM²
AV VALVE AREA: 3.03 CM²
AV VELOCITY RATIO: 0.9
BSA FOR ECHO PROCEDURE: 2.11 M2
CV ECHO LV RWT: 0.4 CM
DOP CALC AO PEAK VEL: 1.28 M/S
DOP CALC AO VTI: 25.7 CM
DOP CALC LVOT AREA: 3.5 CM2
DOP CALC LVOT DIAMETER: 2.1 CM
DOP CALC LVOT PEAK VEL: 1.15 M/S
DOP CALC LVOT STROKE VOLUME: 77.89 CM3
DOP CALC MV VTI: 22.7 CM
DOP CALCLVOT PEAK VEL VTI: 22.5 CM
E WAVE DECELERATION TIME: 152 MSEC
E/A RATIO: 1.4
E/E' RATIO: 8.96 M/S
ECHO LV POSTERIOR WALL: 0.84 CM (ref 0.6–1.1)
FRACTIONAL SHORTENING: 35 % (ref 28–44)
INTERVENTRICULAR SEPTUM: 0.85 CM (ref 0.6–1.1)
IVC DIAMETER: 1.1 CM
IVRT: 53 MSEC
LEFT ATRIUM SIZE: 3.2 CM
LEFT ATRIUM VOLUME INDEX MOD: 17.9 ML/M2
LEFT ATRIUM VOLUME MOD: 36 CM3
LEFT INTERNAL DIMENSION IN SYSTOLE: 2.69 CM (ref 2.1–4)
LEFT VENTRICLE DIASTOLIC VOLUME INDEX: 38.21 ML/M2
LEFT VENTRICLE DIASTOLIC VOLUME: 76.8 ML
LEFT VENTRICLE MASS INDEX: 53 G/M2
LEFT VENTRICLE SYSTOLIC VOLUME INDEX: 13.3 ML/M2
LEFT VENTRICLE SYSTOLIC VOLUME: 26.8 ML
LEFT VENTRICULAR INTERNAL DIMENSION IN DIASTOLE: 4.16 CM (ref 3.5–6)
LEFT VENTRICULAR MASS: 107.27 G
LV LATERAL E/E' RATIO: 8 M/S
LV SEPTAL E/E' RATIO: 10.18 M/S
LVOT MG: 3 MMHG
LVOT MV: 0.74 CM/S
MV MEAN GRADIENT: 2 MMHG
MV PEAK A VEL: 0.8 M/S
MV PEAK E VEL: 1.12 M/S
MV PEAK GRADIENT: 5 MMHG
MV VALVE AREA BY CONTINUITY EQUATION: 3.43 CM2
PV MV: 0.89 M/S
PV PEAK GRADIENT: 7 MMHG
PV PEAK VELOCITY: 1.3 M/S
RA PRESSURE ESTIMATED: 3 MMHG
RIGHT VENTRICULAR END-DIASTOLIC DIMENSION: 2.03 CM
RV TISSUE DOPPLER FREE WALL SYSTOLIC VELOCITY 1 (APICAL 4 CHAMBER VIEW): 13.7 CM/S
TDI LATERAL: 0.14 M/S
TDI SEPTAL: 0.11 M/S
TDI: 0.13 M/S
TRICUSPID ANNULAR PLANE SYSTOLIC EXCURSION: 1.97 CM
Z-SCORE OF LEFT VENTRICULAR DIMENSION IN END DIASTOLE: -3.48
Z-SCORE OF LEFT VENTRICULAR DIMENSION IN END SYSTOLE: -2.33

## 2023-11-03 PROCEDURE — 1159F PR MEDICATION LIST DOCUMENTED IN MEDICAL RECORD: ICD-10-PCS | Mod: CPTII,S$GLB,, | Performed by: FAMILY MEDICINE

## 2023-11-03 PROCEDURE — 3074F SYST BP LT 130 MM HG: CPT | Mod: CPTII,S$GLB,, | Performed by: FAMILY MEDICINE

## 2023-11-03 PROCEDURE — 3074F PR MOST RECENT SYSTOLIC BLOOD PRESSURE < 130 MM HG: ICD-10-PCS | Mod: CPTII,S$GLB,, | Performed by: FAMILY MEDICINE

## 2023-11-03 PROCEDURE — 99213 OFFICE O/P EST LOW 20 MIN: CPT | Mod: S$GLB,,, | Performed by: FAMILY MEDICINE

## 2023-11-03 PROCEDURE — 1160F PR REVIEW ALL MEDS BY PRESCRIBER/CLIN PHARMACIST DOCUMENTED: ICD-10-PCS | Mod: CPTII,S$GLB,, | Performed by: FAMILY MEDICINE

## 2023-11-03 PROCEDURE — 3044F PR MOST RECENT HEMOGLOBIN A1C LEVEL <7.0%: ICD-10-PCS | Mod: CPTII,S$GLB,, | Performed by: FAMILY MEDICINE

## 2023-11-03 PROCEDURE — 3078F DIAST BP <80 MM HG: CPT | Mod: CPTII,S$GLB,, | Performed by: FAMILY MEDICINE

## 2023-11-03 PROCEDURE — 3008F PR BODY MASS INDEX (BMI) DOCUMENTED: ICD-10-PCS | Mod: CPTII,S$GLB,, | Performed by: FAMILY MEDICINE

## 2023-11-03 PROCEDURE — 3078F PR MOST RECENT DIASTOLIC BLOOD PRESSURE < 80 MM HG: ICD-10-PCS | Mod: CPTII,S$GLB,, | Performed by: FAMILY MEDICINE

## 2023-11-03 PROCEDURE — 3044F HG A1C LEVEL LT 7.0%: CPT | Mod: CPTII,S$GLB,, | Performed by: FAMILY MEDICINE

## 2023-11-03 PROCEDURE — 1159F MED LIST DOCD IN RCRD: CPT | Mod: CPTII,S$GLB,, | Performed by: FAMILY MEDICINE

## 2023-11-03 PROCEDURE — 3008F BODY MASS INDEX DOCD: CPT | Mod: CPTII,S$GLB,, | Performed by: FAMILY MEDICINE

## 2023-11-03 PROCEDURE — 99213 PR OFFICE/OUTPT VISIT, EST, LEVL III, 20-29 MIN: ICD-10-PCS | Mod: S$GLB,,, | Performed by: FAMILY MEDICINE

## 2023-11-03 PROCEDURE — 1160F RVW MEDS BY RX/DR IN RCRD: CPT | Mod: CPTII,S$GLB,, | Performed by: FAMILY MEDICINE

## 2023-11-03 RX ORDER — ESCITALOPRAM OXALATE 10 MG/1
10 TABLET ORAL DAILY
COMMUNITY
End: 2023-11-03 | Stop reason: SDUPTHER

## 2023-11-03 RX ORDER — ESCITALOPRAM OXALATE 10 MG/1
10 TABLET ORAL DAILY
Qty: 90 TABLET | Refills: 0 | Status: SHIPPED | OUTPATIENT
Start: 2023-11-03 | End: 2024-04-03

## 2023-11-05 PROBLEM — D64.9 ANEMIA: Status: ACTIVE | Noted: 2023-11-05

## 2023-11-05 NOTE — PROGRESS NOTES
Subjective:       Patient ID: Shalini Stephen is a 38 y.o. female.    Chief Complaint: Follow-up    Follow-up anxiety.  Did not start medication previously.  Was given prescription.  Xanax only for p.r.n. use.  Panic attacks.  Had dental surgery.  Has been very anxious and nervous ever since then has not been able to get back to normal.  Given Lexapro before.  No depression issues.  Will give her the Lexapro again and she will start at this time.  Anemia ferritin is up to 21 now.  Was 8 and 5 previously.  Using ferrous gluconate now.  BMI is at 41.6.      Physical examination.  Vital signs noted.  Neck without bruit.  Chest clear.  Heart regular rate rhythm.  Mood and affect are normal.        Objective:        Assessment:       1. Anxiety    2. Anemia, unspecified type    3. BMI 40.0-44.9, adult        Plan:       Anxiety    Anemia, unspecified type  -     CBC Auto Differential; Future; Expected date: 12/03/2023    BMI 40.0-44.9, adult    Other orders  -     EScitalopram oxalate (LEXAPRO) 10 MG tablet; Take 1 tablet (10 mg total) by mouth once daily.  Dispense: 90 tablet; Refill: 0    Start Lexapro 10 mg daily 90 pills.  Follow-up in 6 weeks.  CBC at follow-up.  Declines flu shot.  Needs to go for Pap smear.  She may decrease her iron dose to a maintenance dose now.  P.r.n. Xanax.

## 2023-11-07 ENCOUNTER — OFFICE VISIT (OUTPATIENT)
Dept: CARDIOLOGY | Facility: CLINIC | Age: 38
End: 2023-11-07
Payer: COMMERCIAL

## 2023-11-07 VITALS
HEIGHT: 62 IN | OXYGEN SATURATION: 100 % | SYSTOLIC BLOOD PRESSURE: 130 MMHG | DIASTOLIC BLOOD PRESSURE: 70 MMHG | HEART RATE: 90 BPM | BODY MASS INDEX: 42.33 KG/M2 | WEIGHT: 230 LBS

## 2023-11-07 DIAGNOSIS — F41.9 ANXIETY: Primary | ICD-10-CM

## 2023-11-07 DIAGNOSIS — R00.2 PALPITATIONS: ICD-10-CM

## 2023-11-07 PROCEDURE — 3075F PR MOST RECENT SYSTOLIC BLOOD PRESS GE 130-139MM HG: ICD-10-PCS | Mod: CPTII,S$GLB,, | Performed by: NURSE PRACTITIONER

## 2023-11-07 PROCEDURE — 3044F HG A1C LEVEL LT 7.0%: CPT | Mod: CPTII,S$GLB,, | Performed by: NURSE PRACTITIONER

## 2023-11-07 PROCEDURE — 99214 PR OFFICE/OUTPT VISIT, EST, LEVL IV, 30-39 MIN: ICD-10-PCS | Mod: S$GLB,,, | Performed by: NURSE PRACTITIONER

## 2023-11-07 PROCEDURE — 99999 PR PBB SHADOW E&M-EST. PATIENT-LVL IV: ICD-10-PCS | Mod: PBBFAC,,, | Performed by: NURSE PRACTITIONER

## 2023-11-07 PROCEDURE — 1159F PR MEDICATION LIST DOCUMENTED IN MEDICAL RECORD: ICD-10-PCS | Mod: CPTII,S$GLB,, | Performed by: NURSE PRACTITIONER

## 2023-11-07 PROCEDURE — 3008F BODY MASS INDEX DOCD: CPT | Mod: CPTII,S$GLB,, | Performed by: NURSE PRACTITIONER

## 2023-11-07 PROCEDURE — 99999 PR PBB SHADOW E&M-EST. PATIENT-LVL IV: CPT | Mod: PBBFAC,,, | Performed by: NURSE PRACTITIONER

## 2023-11-07 PROCEDURE — 3008F PR BODY MASS INDEX (BMI) DOCUMENTED: ICD-10-PCS | Mod: CPTII,S$GLB,, | Performed by: NURSE PRACTITIONER

## 2023-11-07 PROCEDURE — 1159F MED LIST DOCD IN RCRD: CPT | Mod: CPTII,S$GLB,, | Performed by: NURSE PRACTITIONER

## 2023-11-07 PROCEDURE — 3075F SYST BP GE 130 - 139MM HG: CPT | Mod: CPTII,S$GLB,, | Performed by: NURSE PRACTITIONER

## 2023-11-07 PROCEDURE — 3044F PR MOST RECENT HEMOGLOBIN A1C LEVEL <7.0%: ICD-10-PCS | Mod: CPTII,S$GLB,, | Performed by: NURSE PRACTITIONER

## 2023-11-07 PROCEDURE — 99214 OFFICE O/P EST MOD 30 MIN: CPT | Mod: S$GLB,,, | Performed by: NURSE PRACTITIONER

## 2023-11-07 PROCEDURE — 3078F PR MOST RECENT DIASTOLIC BLOOD PRESSURE < 80 MM HG: ICD-10-PCS | Mod: CPTII,S$GLB,, | Performed by: NURSE PRACTITIONER

## 2023-11-07 PROCEDURE — 3078F DIAST BP <80 MM HG: CPT | Mod: CPTII,S$GLB,, | Performed by: NURSE PRACTITIONER

## 2023-11-07 RX ORDER — LANOLIN ALCOHOL/MO/W.PET/CERES
400 CREAM (GRAM) TOPICAL DAILY
Refills: 0
Start: 2023-11-07

## 2023-11-07 NOTE — ASSESSMENT & PLAN NOTE
Cardiac event monitor preliminary results show sinus rhythm with average HR 88 BPM. She did have some amount of 1st degree AVB and rare ectopy. She had 11 symptom episodes which did not reveal any arrhythmias or significant elevations in HR.     Echocardiogram without any major valve issues. EF is stable.     EST negative.

## 2023-11-07 NOTE — PROGRESS NOTES
Subjective:    Patient ID:  Shalini Stephen is a 38 y.o. female   Chief Complaint   Patient presents with    Follow-up    Results       HPI:  Patient seen today for follow up and test results. She continues to have some SOB and palpitations at times. She states she is feeling a bit better.     Review of patient's allergies indicates:   Allergen Reactions    Shellfish containing products     Vicodin [hydrocodone-acetaminophen] Nausea And Vomiting       Past Medical History:   Diagnosis Date    Allergy     Anemia, unspecified     Asthma      History reviewed. No pertinent surgical history.  Social History     Tobacco Use    Smoking status: Never    Smokeless tobacco: Never   Substance Use Topics    Alcohol use: Yes    Drug use: Not Currently     Family History   Problem Relation Age of Onset    Migraines Mother         Review of Systems:   Per HPI         Objective:        Vitals:    11/07/23 1151   BP: 130/70   Pulse: 90       Lab Results   Component Value Date    WBC 8.64 09/21/2023    HGB 11.6 (L) 09/21/2023    HCT 35.2 (L) 09/21/2023     09/21/2023    CHOL 176 05/26/2023    TRIG 12 (L) 05/26/2023    HDL 57 05/26/2023    ALT 12 09/21/2023    AST 14 09/21/2023     09/21/2023    K 3.7 09/21/2023     09/21/2023    CREATININE 0.6 09/21/2023    BUN 9 09/21/2023    CO2 26 09/21/2023    TSH 0.552 09/21/2023    HGBA1C 5.3 05/26/2023        ECHOCARDIOGRAM RESULTS  Results for orders placed during the hospital encounter of 10/31/23    Echo    Interpretation Summary    Left Ventricle: The left ventricle is normal in size. Normal wall thickness. Normal wall motion. There is normal systolic function with a visually estimated ejection fraction of 55 - 60%. There is normal diastolic function. E/A ratio is 1.40. Average E/e' ratio is 8.96.    Right Ventricle: Normal right ventricular cavity size. Wall thickness is normal. Right ventricle wall motion  is normal. Systolic function is normal.    IVC/SVC: Normal  venous pressure at 3 mmHg.        CURRENT/PREVIOUS VISIT EKG  Results for orders placed or performed during the hospital encounter of 09/21/23   EKG 12-lead    Collection Time: 09/21/23 12:13 PM    Narrative    Test Reason : R07.9,    Vent. Rate : 084 BPM     Atrial Rate : 084 BPM     P-R Int : 150 ms          QRS Dur : 070 ms      QT Int : 360 ms       P-R-T Axes : 013 060 057 degrees     QTc Int : 425 ms    Normal sinus rhythm  Nonspecific T wave abnormality  Abnormal ECG    Confirmed by Titi Rondon MD ([id number]) on 9/21/2023 10:45:53 PM    Referred By: BASILIO   SELF           Confirmed By:Titi Rondon MD     No valid procedures specified.   Results for orders placed during the hospital encounter of 10/09/23    Exercise Stress - EKG    Interpretation Summary    The patient exercised for 6 minutes 0 seconds on a Yanvi protocol, corresponding to a functional capacity of 7 METS, achieving a peak heart rate of 169 bpm, which is 97 % of the age predicted maximum heart rate.    The ECG portion of the study is negative for ischemia.    The patient reported no chest pain during the stress test.    The blood pressure response to stress was normal.    There were no arrhythmias during stress.      Physical Exam:  CONSTITUTIONAL: No fever, no chills  HEENT: Normocephalic, atraumatic,pupils reactive to light                 NECK:  No JVD no carotid bruit  CVS: S1S2+, RRR  LUNGS: Clear  ABDOMEN: Soft, NT, BS+  EXTREMITIES: No cyanosis, edema  : No griffith catheter  NEURO: AAO X 3  PSY: Normal affect      Medication List with Changes/Refills   New Medications    MAGNESIUM OXIDE (MAG-OX) 400 MG (241.3 MG MAGNESIUM) TABLET    Take 1 tablet (400 mg total) by mouth once daily.   Current Medications    ALBUTEROL (PROVENTIL/VENTOLIN HFA) 90 MCG/ACTUATION INHALER    Inhale 1-2 puffs into the lungs every 6 (six) hours as needed for Wheezing. Rescue    ALBUTEROL SULFATE 2.5 MG/0.5 ML NEBU    Take 2.5 mg by nebulization  every 4 (four) hours as needed (cough). Rescue    ALPRAZOLAM (XANAX) 0.5 MG TABLET    Take 1 tablet (0.5 mg total) by mouth daily as needed for Anxiety.    AMOXICILLIN (AMOXIL) 500 MG CAPSULE    Take 500 mg by mouth 3 (three) times daily.    AZELASTINE (ASTELIN) 137 MCG (0.1 %) NASAL SPRAY    1 spray (137 mcg total) by Nasal route 2 (two) times daily.    BUDESONIDE (PULMICORT) 0.5 MG/2 ML NEBULIZER SOLUTION    1 vial into sinus rinse twice per day    EPINEPHRINE (EPIPEN) 0.3 MG/0.3 ML ATIN    Inject 0.3 mLs (0.3 mg total) into the muscle once. for 1 dose    ESCITALOPRAM OXALATE (LEXAPRO) 10 MG TABLET    Take 1 tablet (10 mg total) by mouth once daily.    FERROUS GLUCONATE (FERGON) 324 MG TABLET    Take 324 mg by mouth daily with breakfast.    IPRATROPIUM (ATROVENT) 21 MCG (0.03 %) NASAL SPRAY    2 sprays by Each Nostril route 4 (four) times daily.    PANTOPRAZOLE (PROTONIX) 40 MG TABLET    Take 1 tablet (40 mg total) by mouth once daily.    SUMATRIPTAN (IMITREX) 100 MG TABLET    TAKE ONE TABLET PO ONSET OF MIGRAIN MAX             Assessment:       1. Anxiety    2. Palpitations         Plan:     Problem List Items Addressed This Visit          Unprioritized    Anxiety - Primary    Current Assessment & Plan     Likely cause of symptoms. Recommend follow up with PCP.         Palpitations    Current Assessment & Plan     Cardiac event monitor preliminary results show sinus rhythm with average HR 88 BPM. She did have some amount of 1st degree AVB and rare ectopy. She had 11 symptom episodes which did not reveal any arrhythmias or significant elevations in HR.     Echocardiogram without any major valve issues. EF is stable.     EST negative.             Follow up in about 6 months (around 5/7/2024).

## 2024-01-05 ENCOUNTER — PATIENT MESSAGE (OUTPATIENT)
Dept: ADMINISTRATIVE | Facility: HOSPITAL | Age: 39
End: 2024-01-05
Payer: COMMERCIAL

## 2024-03-05 ENCOUNTER — HOSPITAL ENCOUNTER (EMERGENCY)
Facility: HOSPITAL | Age: 39
Discharge: HOME | End: 2024-03-05
Attending: EMERGENCY MEDICINE
Payer: COMMERCIAL

## 2024-03-05 ENCOUNTER — TELEPHONE (OUTPATIENT)
Dept: CARDIOLOGY | Facility: CLINIC | Age: 39
End: 2024-03-05
Payer: COMMERCIAL

## 2024-03-05 VITALS
TEMPERATURE: 99 F | HEART RATE: 79 BPM | OXYGEN SATURATION: 100 % | RESPIRATION RATE: 17 BRPM | BODY MASS INDEX: 44.17 KG/M2 | HEIGHT: 60 IN | SYSTOLIC BLOOD PRESSURE: 139 MMHG | WEIGHT: 225 LBS | DIASTOLIC BLOOD PRESSURE: 87 MMHG

## 2024-03-05 DIAGNOSIS — R07.9 CHEST PAIN: ICD-10-CM

## 2024-03-05 DIAGNOSIS — R00.2 PALPITATIONS: Primary | ICD-10-CM

## 2024-03-05 LAB
ALBUMIN SERPL BCP-MCNC: 4 G/DL (ref 3.5–5.2)
ALP SERPL-CCNC: 74 U/L (ref 55–135)
ALT SERPL W/O P-5'-P-CCNC: 11 U/L (ref 10–44)
ANION GAP SERPL CALC-SCNC: 6 MMOL/L (ref 8–16)
AST SERPL-CCNC: 12 U/L (ref 10–40)
B-HCG UR QL: NEGATIVE
BASOPHILS # BLD AUTO: 0.03 K/UL (ref 0–0.2)
BASOPHILS NFR BLD: 0.4 % (ref 0–1.9)
BILIRUB SERPL-MCNC: 0.5 MG/DL (ref 0.1–1)
BNP SERPL-MCNC: 47 PG/ML (ref 0–99)
BUN SERPL-MCNC: 7 MG/DL (ref 6–20)
CALCIUM SERPL-MCNC: 8.5 MG/DL (ref 8.7–10.5)
CHLORIDE SERPL-SCNC: 106 MMOL/L (ref 95–110)
CO2 SERPL-SCNC: 25 MMOL/L (ref 23–29)
CREAT SERPL-MCNC: 0.6 MG/DL (ref 0.5–1.4)
CTP QC/QA: YES
D DIMER PPP IA.FEU-MCNC: 0.27 MG/L FEU (ref 0–0.49)
DIFFERENTIAL METHOD BLD: ABNORMAL
EOSINOPHIL # BLD AUTO: 0.1 K/UL (ref 0–0.5)
EOSINOPHIL NFR BLD: 1.1 % (ref 0–8)
ERYTHROCYTE [DISTWIDTH] IN BLOOD BY AUTOMATED COUNT: 13.6 % (ref 11.5–14.5)
EST. GFR  (NO RACE VARIABLE): >60 ML/MIN/1.73 M^2
GLUCOSE SERPL-MCNC: 88 MG/DL (ref 70–110)
HCT VFR BLD AUTO: 37.3 % (ref 37–48.5)
HGB BLD-MCNC: 11.9 G/DL (ref 12–16)
IMM GRANULOCYTES # BLD AUTO: 0.03 K/UL (ref 0–0.04)
IMM GRANULOCYTES NFR BLD AUTO: 0.4 % (ref 0–0.5)
INR PPP: 1 (ref 0.8–1.2)
LYMPHOCYTES # BLD AUTO: 1.7 K/UL (ref 1–4.8)
LYMPHOCYTES NFR BLD: 20.5 % (ref 18–48)
MAGNESIUM SERPL-MCNC: 1.8 MG/DL (ref 1.6–2.6)
MCH RBC QN AUTO: 27.3 PG (ref 27–31)
MCHC RBC AUTO-ENTMCNC: 31.9 G/DL (ref 32–36)
MCV RBC AUTO: 86 FL (ref 82–98)
MONOCYTES # BLD AUTO: 0.7 K/UL (ref 0.3–1)
MONOCYTES NFR BLD: 8.4 % (ref 4–15)
NEUTROPHILS # BLD AUTO: 5.6 K/UL (ref 1.8–7.7)
NEUTROPHILS NFR BLD: 69.2 % (ref 38–73)
NRBC BLD-RTO: 0 /100 WBC
PLATELET # BLD AUTO: 400 K/UL (ref 150–450)
PMV BLD AUTO: 9.8 FL (ref 9.2–12.9)
POTASSIUM SERPL-SCNC: 3.8 MMOL/L (ref 3.5–5.1)
PROT SERPL-MCNC: 7 G/DL (ref 6–8.4)
PROTHROMBIN TIME: 10.9 SEC (ref 9–12.5)
RBC # BLD AUTO: 4.36 M/UL (ref 4–5.4)
SODIUM SERPL-SCNC: 137 MMOL/L (ref 136–145)
TROPONIN I SERPL HS-MCNC: <2.3 PG/ML (ref 0–14.9)
TROPONIN I SERPL HS-MCNC: <2.3 PG/ML (ref 0–14.9)
TSH SERPL DL<=0.005 MIU/L-ACNC: 1.05 UIU/ML (ref 0.34–5.6)
WBC # BLD AUTO: 8.13 K/UL (ref 3.9–12.7)

## 2024-03-05 PROCEDURE — 80053 COMPREHEN METABOLIC PANEL: CPT | Performed by: EMERGENCY MEDICINE

## 2024-03-05 PROCEDURE — 85025 COMPLETE CBC W/AUTO DIFF WBC: CPT | Performed by: EMERGENCY MEDICINE

## 2024-03-05 PROCEDURE — 99285 EMERGENCY DEPT VISIT HI MDM: CPT | Mod: 25

## 2024-03-05 PROCEDURE — 81025 URINE PREGNANCY TEST: CPT | Performed by: EMERGENCY MEDICINE

## 2024-03-05 PROCEDURE — 93010 ELECTROCARDIOGRAM REPORT: CPT | Mod: ,,, | Performed by: INTERNAL MEDICINE

## 2024-03-05 PROCEDURE — 85610 PROTHROMBIN TIME: CPT | Performed by: EMERGENCY MEDICINE

## 2024-03-05 PROCEDURE — 83880 ASSAY OF NATRIURETIC PEPTIDE: CPT | Performed by: EMERGENCY MEDICINE

## 2024-03-05 PROCEDURE — 93005 ELECTROCARDIOGRAM TRACING: CPT | Performed by: INTERNAL MEDICINE

## 2024-03-05 PROCEDURE — 83735 ASSAY OF MAGNESIUM: CPT | Performed by: EMERGENCY MEDICINE

## 2024-03-05 PROCEDURE — 84484 ASSAY OF TROPONIN QUANT: CPT | Performed by: EMERGENCY MEDICINE

## 2024-03-05 PROCEDURE — 84443 ASSAY THYROID STIM HORMONE: CPT | Performed by: EMERGENCY MEDICINE

## 2024-03-05 PROCEDURE — 85379 FIBRIN DEGRADATION QUANT: CPT | Performed by: EMERGENCY MEDICINE

## 2024-03-05 NOTE — Clinical Note
"Shalini Alarconrohini Stephen was seen and treated in our emergency department on 3/5/2024.  She may return to work on 03/11/2024.       If you have any questions or concerns, please don't hesitate to call.      NIGHAT Fitzgerald"

## 2024-03-05 NOTE — FIRST PROVIDER EVALUATION
Medical screening examination initiated.  I have conducted a focused provider triage encounter, findings are as follows:    Brief history of present illness:  chest pain     Vitals:    03/05/24 0815   BP: 139/87   BP Location: Right arm   Patient Position: Sitting   Pulse: 79   Resp: 17   Temp: 98.5 °F (36.9 °C)   TempSrc: Oral   SpO2: 100%   Weight: 102.1 kg (225 lb)   Height: 5' (1.524 m)       Pertinent physical exam:  reports chest pain off an on since yesterday elevated heart rate at times and pain with breathing off and on , does not take take BCP    Brief workup plan:  Labs EKG cxr     Preliminary workup initiated; this workup will be continued and followed by the physician or advanced practice provider that is assigned to the patient when roomed.

## 2024-03-05 NOTE — ED PROVIDER NOTES
Encounter Date: 3/5/2024       History     Chief Complaint   Patient presents with    Chest Pain    Palpitations     Onset x 3 days.      Patient presents emergency department with reported palpitations and chest pain that been occurring in an episodic fashion over last 3 days patient states that symptoms occur at both exertion and rest she denies any antecedent illness denies any nausea vomiting no shortness of breath no cough fever chills diaphoresis patient states she had similar symptoms in the past seen she believes in November of last year had a cardiac workup performed at that time was told that she had an AV block she was having fast heartbeat at times she was not placed on any medication at that time she reports family history of mitral valve prolapse and her grandmother but no other cardiac disease reported there is no personal history of heart disease        Review of patient's allergies indicates:   Allergen Reactions    Shellfish containing products     Vicodin [hydrocodone-acetaminophen] Nausea And Vomiting     Past Medical History:   Diagnosis Date    Allergy     Anemia, unspecified     Asthma      No past surgical history on file.  Family History   Problem Relation Age of Onset    Migraines Mother      Social History     Tobacco Use    Smoking status: Never    Smokeless tobacco: Never   Substance Use Topics    Alcohol use: Yes    Drug use: Not Currently     Review of Systems   Constitutional:  Negative for chills, diaphoresis and fever.   Respiratory:  Negative for cough, choking and shortness of breath.    Cardiovascular:  Positive for chest pain and palpitations.       Physical Exam     Initial Vitals [03/05/24 0815]   BP Pulse Resp Temp SpO2   139/87 79 17 98.5 °F (36.9 °C) 100 %      MAP       --         Physical Exam    Constitutional: She appears well-developed and well-nourished. No distress.   HENT:   Head: Normocephalic and atraumatic.   Right Ear: External ear normal.   Left Ear: External  ear normal.   Mouth/Throat: Oropharynx is clear and moist.   Eyes: Conjunctivae and EOM are normal. Pupils are equal, round, and reactive to light.   Neck: Neck supple.   Normal range of motion.  Cardiovascular:  Normal rate, regular rhythm, normal heart sounds and intact distal pulses.           Pulmonary/Chest: Breath sounds normal. No respiratory distress.   Abdominal: Abdomen is soft. Bowel sounds are normal. There is no abdominal tenderness.   Musculoskeletal:         General: No edema. Normal range of motion.      Cervical back: Normal range of motion and neck supple.     Neurological: She is alert and oriented to person, place, and time. She has normal strength. GCS score is 15. GCS eye subscore is 4. GCS verbal subscore is 5. GCS motor subscore is 6.   Skin: Skin is warm and dry. Capillary refill takes less than 2 seconds. No rash noted.   Psychiatric: She has a normal mood and affect. Her behavior is normal.         ED Course   Procedures  Labs Reviewed   CBC W/ AUTO DIFFERENTIAL - Abnormal; Notable for the following components:       Result Value    Hemoglobin 11.9 (*)     MCHC 31.9 (*)     All other components within normal limits   COMPREHENSIVE METABOLIC PANEL - Abnormal; Notable for the following components:    Calcium 8.5 (*)     Anion Gap 6 (*)     All other components within normal limits   B-TYPE NATRIURETIC PEPTIDE   MAGNESIUM   TROPONIN I HIGH SENSITIVITY   PROTIME-INR   TSH   D DIMER, QUANTITATIVE   TROPONIN I HIGH SENSITIVITY   POCT URINE PREGNANCY        ECG Results              EKG 12-lead (In process)        Collection Time Result Time QRS Duration OHS QTC Calculation    03/05/24 08:19:49 03/05/24 08:22:01 70 425                     In process by Interface, Lab In [facility] (03/05/24 08:22:07)                   Narrative:    Test Reason : R07.9,    Vent. Rate : 088 BPM     Atrial Rate : 088 BPM     P-R Int : 142 ms          QRS Dur : 070 ms      QT Int : 352 ms       P-R-T Axes : 032 058 056  degrees     QTc Int : 425 ms    Normal sinus rhythm  Nonspecific T wave abnormality  Abnormal ECG  When compared with ECG of 21-SEP-2023 12:13,  No significant change was found    Referred By:             Confirmed By:                                   Imaging Results              X-Ray Chest PA And Lateral (Final result)  Result time 03/05/24 08:46:01   Procedure changed from X-Ray Chest AP Portable     Final result by Wilman Valencia MD (03/05/24 08:46:01)                   Narrative:    XR CHEST 2 VIEWS    CLINICAL HISTORY:  38 years Female chest pain    COMPARISON: September 21, 2023    FINDINGS: Cardiomediastinal silhouette is within normal limits. Lungs are normally expanded with no airspace consolidation. No pleural effusion or pneumothorax. No acute osseous abnormality.    IMPRESSION: No acute pulmonary process.    Electronically signed by:  Wilman Valencia MD  03/05/2024 08:46 AM CST Workstation: [id number]                                     Medications - No data to display  Medical Decision Making  I reviewed patient's evaluation from October of last year she had stress testing with nuclear studies echocardiogram and Holter monitoring the stress test were negative of the echo was normal the Holter monitor did show PVCs some episodes of tachycardia some episodes of couple with triplets but no other ectopy noted will repeat patient's Holter monitor given her return of symptoms patient states that her primary provider put her on Lexapro in attempt to control her symptoms but she just started taking 3 days ago I advised her that she should continue the Lexapro we will obtain the outpatient Holter monitor I have asked her to follow-up with her primary provider and cardi[facility] for re-evaluation after the Holter monitor return to the ER for worsened symptoms or new symptoms or concerns                                      Clinical Impression:  Final diagnoses:  [R07.9] Chest pain  [R00.2] Palpitations  (Primary)          ED Disposition Condition    Discharge Stable          ED Prescriptions    None       Follow-up Information       Follow up With Specialties Details Why Contact Info    Ge Mathis III, MD Family Medicine Call in 1 day for re-examination of your symptoms [address] [facility] [place]  [address] [place] [id number]  [phone]               Leno Hernandez MD  03/05/24 [id number]

## 2024-03-11 ENCOUNTER — HOSPITAL ENCOUNTER (OUTPATIENT)
Dept: CARDIOLOGY | Facility: CLINIC | Age: 39
Discharge: HOME | End: 2024-03-11
Attending: EMERGENCY MEDICINE
Payer: COMMERCIAL

## 2024-03-11 DIAGNOSIS — R00.2 PALPITATIONS: ICD-10-CM

## 2024-03-11 PROCEDURE — 93244 EXT ECG>48HR<7D REV&INTERPJ: CPT | Mod: ,,, | Performed by: GENERAL PRACTICE

## 2024-03-11 PROCEDURE — 93242 EXT ECG>48HR<7D RECORDING: CPT | Mod: ,,, | Performed by: GENERAL PRACTICE

## 2024-03-22 ENCOUNTER — TELEPHONE (OUTPATIENT)
Dept: CARDIOLOGY | Facility: CLINIC | Age: 39
End: 2024-03-22
Payer: COMMERCIAL

## 2024-03-22 LAB
OHS CV EVENT MONITOR DAY: 6
OHS CV HOLTER HOOKUP DATE: NORMAL
OHS CV HOLTER HOOKUP TIME: NORMAL
OHS CV HOLTER LENGTH DECIMAL HOURS: 151
OHS CV HOLTER LENGTH HOURS: 7
OHS CV HOLTER LENGTH MINUTES: 0
OHS CV HOLTER SCAN DATE: NORMAL
OHS CV HOLTER SINUS AVERAGE HR: 86 BPM
OHS CV HOLTER SINUS MAX HR: 147 BPM
OHS CV HOLTER SINUS MIN HR: 57 BPM
OHS CV HOLTER STUDY END DATE: NORMAL
OHS CV HOLTER STUDY END TIME: 1753

## 2024-03-22 NOTE — TELEPHONE ENCOUNTER
----- Message from Yumiko Giron sent at 3/22/2024 10:25 AM CDT -----  Regarding: Needs Medical Advice  Contact: patient at [phone]  Type: Needs Medical Advice  Who Called:  patient at [phone]    Additional Information: would like to discuss results for heart monitor. Please call and advise. Thank you

## 2024-03-25 LAB
OHS QRS DURATION: 70 MS
OHS QTC CALCULATION: 425 MS

## 2024-04-03 ENCOUNTER — OFFICE VISIT (OUTPATIENT)
Dept: CARDIOLOGY | Facility: CLINIC | Age: 39
End: 2024-04-03
Payer: COMMERCIAL

## 2024-04-03 VITALS
HEIGHT: 62 IN | WEIGHT: 230.81 LBS | OXYGEN SATURATION: 99 % | BODY MASS INDEX: 42.47 KG/M2 | DIASTOLIC BLOOD PRESSURE: 79 MMHG | HEART RATE: 91 BPM | SYSTOLIC BLOOD PRESSURE: 120 MMHG

## 2024-04-03 DIAGNOSIS — G47.30 SLEEP APNEA, UNSPECIFIED TYPE: ICD-10-CM

## 2024-04-03 DIAGNOSIS — R00.2 PALPITATIONS: ICD-10-CM

## 2024-04-03 DIAGNOSIS — J45.20 MILD INTERMITTENT ASTHMA WITHOUT COMPLICATION: ICD-10-CM

## 2024-04-03 DIAGNOSIS — F41.9 ANXIETY: ICD-10-CM

## 2024-04-03 DIAGNOSIS — E66.01 MORBID (SEVERE) OBESITY DUE TO EXCESS CALORIES: ICD-10-CM

## 2024-04-03 DIAGNOSIS — Z91.013 SHELLFISH ALLERGY: ICD-10-CM

## 2024-04-03 DIAGNOSIS — R07.9 CHEST PAIN, UNSPECIFIED TYPE: Primary | ICD-10-CM

## 2024-04-03 DIAGNOSIS — R00.0 TACHYCARDIA: ICD-10-CM

## 2024-04-03 PROBLEM — K21.9 GASTROESOPHAGEAL REFLUX DISEASE: Status: RESOLVED | Noted: 2020-06-28 | Resolved: 2024-04-03

## 2024-04-03 PROCEDURE — 3074F SYST BP LT 130 MM HG: CPT | Mod: CPTII,S$GLB,, | Performed by: NURSE PRACTITIONER

## 2024-04-03 PROCEDURE — 3008F BODY MASS INDEX DOCD: CPT | Mod: CPTII,S$GLB,, | Performed by: NURSE PRACTITIONER

## 2024-04-03 PROCEDURE — 1159F MED LIST DOCD IN RCRD: CPT | Mod: CPTII,S$GLB,, | Performed by: NURSE PRACTITIONER

## 2024-04-03 PROCEDURE — 1160F RVW MEDS BY RX/DR IN RCRD: CPT | Mod: CPTII,S$GLB,, | Performed by: NURSE PRACTITIONER

## 2024-04-03 PROCEDURE — 93000 ELECTROCARDIOGRAM COMPLETE: CPT | Mod: S$GLB,,, | Performed by: INTERNAL MEDICINE

## 2024-04-03 PROCEDURE — 99999 PR PBB SHADOW E&M-EST. PATIENT-LVL IV: CPT | Mod: PBBFAC,,, | Performed by: NURSE PRACTITIONER

## 2024-04-03 PROCEDURE — 3078F DIAST BP <80 MM HG: CPT | Mod: CPTII,S$GLB,, | Performed by: NURSE PRACTITIONER

## 2024-04-03 PROCEDURE — 99214 OFFICE O/P EST MOD 30 MIN: CPT | Mod: S$GLB,,, | Performed by: NURSE PRACTITIONER

## 2024-04-03 RX ORDER — EPINEPHRINE 0.3 MG/.3ML
1 INJECTION SUBCUTANEOUS ONCE
Qty: 0.3 ML | Refills: 0 | Status: CANCELLED | OUTPATIENT
Start: 2024-04-03 | End: 2024-04-03

## 2024-04-03 RX ORDER — METOPROLOL SUCCINATE 25 MG/1
12.5 TABLET, EXTENDED RELEASE ORAL DAILY
Qty: 15 TABLET | Refills: 11 | Status: SHIPPED | OUTPATIENT
Start: 2024-04-03 | End: 2024-06-03

## 2024-04-03 RX ORDER — ALPRAZOLAM 0.5 MG/1
0.5 TABLET ORAL DAILY PRN
Qty: 30 TABLET | Refills: 0 | Status: CANCELLED | OUTPATIENT
Start: 2024-04-03 | End: 2024-05-03

## 2024-04-03 RX ORDER — LEVALBUTEROL TARTRATE 45 UG/1
1-2 AEROSOL, METERED ORAL EVERY 4 HOURS PRN
Qty: 15 G | Refills: 0 | Status: SHIPPED | OUTPATIENT
Start: 2024-04-03 | End: 2025-04-03

## 2024-04-03 RX ORDER — ALPRAZOLAM 0.5 MG/1
0.5 TABLET ORAL DAILY PRN
Qty: 30 TABLET | Refills: 0 | Status: SHIPPED | OUTPATIENT
Start: 2024-04-03 | End: 2024-06-03

## 2024-04-03 NOTE — PROGRESS NOTES
Subjective:    Patient ID:  Shalini Stephen is a 38 y.o. female patient here for evaluation Follow-up and Results (Monitor f/u )    History of Present Illness:     In clinic today to follow up on complaints of palpitations and racing heartbeat.  She states her Apple watch shows that she has heart rate spikes up to 150 beats per minute.  She questions if she could possibly have sleep apnea as she will often awaken in middle of the night around 4:00 a.m. with her heart racing and notable gasping.  Patient wore Holter monitor.  EKG in the office today showed normal sinus rhythm.  Echo and stress test was normal.  Patient does have history of anxiety and requests Xanax refill.  Patient was history of asthma she does use albuterol inhaler routinely.  She has actively cut down on her caffeine intake          Most Recent Echocardiogram Results  Results for orders placed during the hospital encounter of 10/31/23    Echo    Interpretation Summary    Left Ventricle: The left ventricle is normal in size. Normal wall thickness. Normal wall motion. There is normal systolic function with a visually estimated ejection fraction of 55 - 60%. There is normal diastolic function. E/A ratio is 1.40. Average E/e' ratio is 8.96.    Right Ventricle: Normal right ventricular cavity size. Wall thickness is normal. Right ventricle wall motion  is normal. Systolic function is normal.    IVC/SVC: Normal venous pressure at 3 mmHg.      Most Recent Nuclear Stress Test Results  Results for orders placed during the hospital encounter of 10/09/23    Exercise Stress - EKG    Interpretation Summary    The patient exercised for 6 minutes 0 seconds on a Yaniv protocol, corresponding to a functional capacity of 7 METS, achieving a peak heart rate of 169 bpm, which is 97 % of the age predicted maximum heart rate.    The ECG portion of the study is negative for ischemia.    The patient reported no chest pain during the stress test.    The blood pressure  response to stress was normal.    There were no arrhythmias during stress.      Most Recent Cardiac PET Stress Test Results  No results found for this or any previous visit.      Most Recent Cardiovascular Angiogram results  No results found for this or any previous visit.      Other Most Recent Cardiology Results  Results for orders placed during the hospital encounter of 03/11/24    Cardiac Monitor - 3-15 Day Adult (Cupid Only)    Interpretation Summary    Predominant underlying rhythm was Sinus Rhythm.    Patient had a min HR of 57 bpm, max HR of 147 bpm, and avg HR of 86 bpm.    First Degree AV Block was present.    1 run of Supraventricular Tachycardia occurred lasting 10 beats with a max rate of 132 bpm (avg 108 bpm).    Isolated SVEs were rare (<1.0%, 22), SVE Couplets were rare (<1.0%, 4), and SVE Triplets were rare (<1.0%, 2).    Isolated VEs were rare (<1.0%, 4), and no VE Couplets or VE Triplets were present.    The patient complained of 15 episodes. The symptom(s) included chest pain, dizziness, palpitations, shortness of breath and heart racing. The corresponding rhythm to the patient reported event was normal sinus rhythm with a normal CA interval with rates varying from 75 bpm to 95 bpm and tachycardia with rates of 101 bpm to 109 bpm. These symptoms were associated with PACs.    There were 17 auto-triggered events corresponding with normal sinus with rates of 69 bpm to 99 bpm and sinus tachycardia rhythm with a rates of 106 bpm to 116 bpm. The symptom(s) included PACs and PVCs.    See full report    Benign moniter .    Patient had a min HR of 57 bpm, max HR of 147 bpm, and avg HR of 86 bpm. Predominant underlying rhythm was Sinus Rhythm. First Degree AV Block was present. 1 run of Supraventricular Tachycardia occurred lasting 10 beats with a max rate of 132 bpm (avg 108 bpm). Isolated SVEs were rare (<1.0%, 22), SVE Couplets were rare (<1.0%, 4), and SVE Triplets were rare (<1.0%, 2). Isolated VEs  were rare (<1.0%, 4), and no VE Couplets or VE Triplets were present.      REVIEW OF SYSTEMS: As noted in HPI       Past Medical History:   Diagnosis Date    Allergy     Anemia, unspecified     Asthma      History reviewed. No pertinent surgical history.  Social History     Tobacco Use    Smoking status: Never    Smokeless tobacco: Never   Substance Use Topics    Alcohol use: Yes    Drug use: Not Currently         Objective      Vitals:    04/03/24 1523   BP: 120/79   Pulse: 91       LAST EKG  Results for orders placed or performed during the hospital encounter of 03/05/24   EKG 12-lead    Collection Time: 03/05/24  8:19 AM   Result Value Ref Range    QRS Duration 70 ms    OHS QTC Calculation 425 ms    Narrative    Test Reason : R07.9,    Vent. Rate : 088 BPM     Atrial Rate : 088 BPM     P-R Int : 142 ms          QRS Dur : 070 ms      QT Int : 352 ms       P-R-T Axes : 032 058 056 degrees     QTc Int : 425 ms    Normal sinus rhythm  Nonspecific T wave abnormality  Abnormal ECG  When compared with ECG of 21-SEP-2023 12:13,  No significant change was found  Confirmed by Javier Kern MD ([id number]) on 3/25/2024 11:34:25 AM    Referred By:             Confirmed By:Javier Kern MD     LIPIDS - LAST 2   Lab Results   Component Value Date    CHOL 176 05/26/2023    CHOL 193 06/24/2020    HDL 57 05/26/2023    HDL 63 06/24/2020    LDLCALC 116.6 05/26/2023    LDLCALC 125.4 06/24/2020    TRIG 12 (L) 05/26/2023    TRIG 23 (L) 06/24/2020    CHOLHDL 32.4 05/26/2023    CHOLHDL 32.6 06/24/2020     CARDIAC PROFILE - LAST 2  Lab Results   Component Value Date    BNP 47 03/05/2024    BNP 14 09/21/2023      CBC - LAST 2  Lab Results   Component Value Date    WBC 8.13 03/05/2024    WBC 8.64 09/21/2023    RBC 4.36 03/05/2024    RBC 4.19 09/21/2023    HGB 11.9 (L) 03/05/2024    HGB 11.6 (L) 09/21/2023    HCT 37.3 03/05/2024    HCT 35.2 (L) 09/21/2023     03/05/2024     09/21/2023     Lab Results   Component Value Date     INR 1.0 03/05/2024     CHEMISTRY - LAST 2  Lab Results   Component Value Date     03/05/2024     09/21/2023    K 3.8 03/05/2024    K 3.7 09/21/2023     03/05/2024     09/21/2023    CO2 25 03/05/2024    CO2 26 09/21/2023    ANIONGAP 6 (L) 03/05/2024    ANIONGAP 8 09/21/2023    BUN 7 03/05/2024    BUN 9 09/21/2023    CREATININE 0.6 03/05/2024    CREATININE 0.6 09/21/2023    GLU 88 03/05/2024    GLU 91 09/21/2023    CALCIUM 8.5 (L) 03/05/2024    CALCIUM 8.8 09/21/2023    MG 1.8 03/05/2024    MG 1.7 09/21/2023    ALBUMIN 4.0 03/05/2024    ALBUMIN 4.1 09/21/2023    PROT 7.0 03/05/2024    PROT 7.2 09/21/2023    ALKPHOS 74 03/05/2024    ALKPHOS 64 09/21/2023    ALT 11 03/05/2024    ALT 12 09/21/2023    AST 12 03/05/2024    AST 14 09/21/2023    BILITOT 0.5 03/05/2024    BILITOT 0.1 09/21/2023      ENDOCRINE - LAST 2  Lab Results   Component Value Date    HGBA1C 5.3 05/26/2023    TSH 1.049 03/05/2024    TSH 0.552 09/21/2023        PHYSICAL EXAM  CONSTITUTIONAL: Well built, well nourished in no apparent distress  NECK: no carotid bruit, no JVD  LUNGS: CTA  CHEST WALL: no tenderness  HEART: regular rate and rhythm, S1, S2 normal, no murmur  ABDOMEN: soft, non-tender; bowel sounds normal; no masses  EXTREMITIES: Extremities normal, no edema, no calf tenderness noted  NEURO: AAO X 3, speech clear, memory clear    I HAVE REVIEWED :    The vital signs, most recent cardiac testing, and most recent pertinent non-cardiology provider notes.    Current Outpatient Medications   Medication Instructions    ALPRAZolam (XANAX) 0.5 mg, Oral, Daily PRN    azelastine (ASTELIN) 137 mcg, Nasal, 2 times daily    budesonide (PULMICORT) 0.5 mg/2 mL nebulizer solution 1 vial into sinus rinse twice per day    EPINEPHrine (EPIPEN) 0.3 mg, Intramuscular, Once    ferrous gluconate (FERGON) 324 mg, Oral, With breakfast    ipratropium (ATROVENT) 21 mcg (0.03 %) nasal spray 2 sprays, Each Nostril, 4 times daily    levalbuterol  (XOPENEX HFA) 45 mcg/actuation inhaler 1-2 puffs, Inhalation, Every 4 hours PRN, Rescue    magnesium oxide (MAG-OX) 400 mg, Oral, Daily    metoprolol succinate (TOPROL-XL) 12.5 mg, Oral, Daily    sumatriptan (IMITREX) 100 MG tablet TAKE ONE TABLET PO ONSET OF MIGRAIN MAX      Assessment & Plan     Mild intermittent asthma without complication  Advise switching to levalbuterol for rescue inhaler as chronic albuterol use can lead to racing heartbeat  Prescription for levalbuterol called in and Patient voiced understanding.    Palpitations  Continue magnesium oxide 400 mg daily  Metoprolol succinate 12.5 mg daily or use p.r.n. heart rate persistently elevated greater than 100    She also reports that she will wake up with racing heartbeat and wants to have sleep study to rule out sleep apnea  [place] [facility] [place]    BMI 40.0-44.9, adult  BMI is 42  Patient would benefit from weight loss    Tachycardia  EKG today in the office showed normal sinus rhythm heart rate is controlled  Patient has fluctuations of heart rate noted on her Apple watch  Advised to switch off of albuterol and changed to levalbuterol to avoid exacerbation of tachycardia  Low-dose metoprolol succinate 12.5 mg daily written for patient  Advised patient to follow up in 3 months or sooner if needed    Anxiety  Short-term supply of Xanax refilled but advised patient to follow up with her primary care for anxiety control    She was initiated on SSRI Lexapro but had allergic reaction to this so she is hesitant to try anything else    Advised follow up in 3 months             RAMYA Meredith

## 2024-04-03 NOTE — ASSESSMENT & PLAN NOTE
Continue magnesium oxide 400 mg daily  Metoprolol succinate 12.5 mg daily or use p.r.n. heart rate persistently elevated greater than 100    She also reports that she will wake up with racing heartbeat and wants to have sleep study to rule out sleep apnea  Referral has been sent to [facility] for sleep study

## 2024-04-03 NOTE — ASSESSMENT & PLAN NOTE
Short-term supply of Xanax refilled but advised patient to follow up with her primary care for anxiety control    She was initiated on SSRI Lexapro but had allergic reaction to this so she is hesitant to try anything else

## 2024-04-03 NOTE — ASSESSMENT & PLAN NOTE
EKG today in the office showed normal sinus rhythm heart rate is controlled  Patient has fluctuations of heart rate noted on her Apple watch  Advised to switch off of albuterol and changed to levalbuterol to avoid exacerbation of tachycardia  Low-dose metoprolol succinate 12.5 mg daily written for patient  Advised patient to follow up in 3 months or sooner if needed

## 2024-04-03 NOTE — ASSESSMENT & PLAN NOTE
Advise switching to levalbuterol for rescue inhaler as chronic albuterol use can lead to racing heartbeat  Prescription for levalbuterol called in and Patient voiced understanding.

## 2024-04-14 ENCOUNTER — PATIENT MESSAGE (OUTPATIENT)
Dept: CARDIOLOGY | Facility: CLINIC | Age: 39
End: 2024-04-14
Payer: COMMERCIAL

## 2024-04-15 ENCOUNTER — OFFICE VISIT (OUTPATIENT)
Dept: PULMONOLOGY | Facility: CLINIC | Age: 39
End: 2024-04-15
Payer: COMMERCIAL

## 2024-04-15 VITALS
SYSTOLIC BLOOD PRESSURE: 120 MMHG | HEART RATE: 73 BPM | DIASTOLIC BLOOD PRESSURE: 68 MMHG | BODY MASS INDEX: 42.36 KG/M2 | OXYGEN SATURATION: 99 % | WEIGHT: 231.63 LBS

## 2024-04-15 DIAGNOSIS — G47.30 SLEEP APNEA, UNSPECIFIED TYPE: ICD-10-CM

## 2024-04-15 DIAGNOSIS — R00.2 PALPITATIONS: Primary | ICD-10-CM

## 2024-04-15 DIAGNOSIS — R06.00 DYSPNEA, UNSPECIFIED TYPE: ICD-10-CM

## 2024-04-15 DIAGNOSIS — R53.82 CHRONIC FATIGUE: ICD-10-CM

## 2024-04-15 DIAGNOSIS — R06.83 SNORES: ICD-10-CM

## 2024-04-15 PROCEDURE — 3008F BODY MASS INDEX DOCD: CPT | Mod: CPTII,S$GLB,, | Performed by: NURSE PRACTITIONER

## 2024-04-15 PROCEDURE — 3078F DIAST BP <80 MM HG: CPT | Mod: CPTII,S$GLB,, | Performed by: NURSE PRACTITIONER

## 2024-04-15 PROCEDURE — 1159F MED LIST DOCD IN RCRD: CPT | Mod: CPTII,S$GLB,, | Performed by: NURSE PRACTITIONER

## 2024-04-15 PROCEDURE — 3074F SYST BP LT 130 MM HG: CPT | Mod: CPTII,S$GLB,, | Performed by: NURSE PRACTITIONER

## 2024-04-15 PROCEDURE — 99203 OFFICE O/P NEW LOW 30 MIN: CPT | Mod: S$GLB,,, | Performed by: NURSE PRACTITIONER

## 2024-04-15 NOTE — PROGRESS NOTES
SUBJECTIVE:    Patient ID: Shalini Stephen is a 38 y.o. female.    Chief Complaint: New Patient (New Patient/Referred by Katarina Valiente for Anxiety, Sleep Apnea)    HPI  Patient here today to be evaluated for PAM per cardiology.  She also has a history of asthma, has never had a PFT. She states she was using Albuterol every other day for shortness of breath but cardiology recommended her to stop due to her palpitations. Xopenex was sent to her pharmacy per cardiology but it does not look like it is covered by insurance.  She states she gets sick 1-2 times a year and will require steroid burst. She has seen allergies in the past.  She is chronically tired, snores,  wakes up 1-2 times a night to urinate. She suffers with palpitations, brain fog and headaches. She states she is also anemic.   Past Medical History:   Diagnosis Date    Allergy     Anemia, unspecified     Asthma      No past surgical history on file.  Family History   Problem Relation Name Age of Onset    Migraines Mother          Social History:   Marital Status:   Occupation: [employer]   Alcohol History:  reports current alcohol use.  Tobacco History:  reports that she has never smoked. She has never used smokeless tobacco.  Drug History:  reports that she does not currently use drugs.    Review of patient's allergies indicates:   Allergen Reactions    Shellfish containing products     Vicodin [hydrocodone-acetaminophen] Nausea And Vomiting       Current Outpatient Medications   Medication Sig Dispense Refill    ALPRAZolam (XANAX) 0.5 MG tablet Take 1 tablet (0.5 mg total) by mouth daily as needed for Anxiety. 30 tablet 0    azelastine (ASTELIN) 137 mcg (0.1 %) nasal spray 1 spray (137 mcg total) by Nasal route 2 (two) times daily. 30 mL 3    budesonide (PULMICORT) 0.5 mg/2 mL nebulizer solution 1 vial into sinus rinse twice per day 120 mL 3    ferrous gluconate (FERGON) 324 MG tablet Take 324 mg by mouth daily with breakfast.       ipratropium (ATROVENT) 21 mcg (0.03 %) nasal spray 2 sprays by Each Nostril route 4 (four) times daily. 30 mL 3    levalbuterol (XOPENEX HFA) 45 mcg/actuation inhaler Inhale 1-2 puffs into the lungs every 4 (four) hours as needed for Wheezing. Rescue 15 g 0    magnesium oxide (MAG-OX) 400 mg (241.3 mg magnesium) tablet Take 1 tablet (400 mg total) by mouth once daily.  0    metoprolol succinate (TOPROL-XL) 25 MG 24 hr tablet Take 0.5 tablets (12.5 mg total) by mouth once daily. 15 tablet 11    sumatriptan (IMITREX) 100 MG tablet TAKE ONE TABLET PO ONSET OF MIGRAIN MAX 9 tablet 2    EPINEPHrine (EPIPEN) 0.3 mg/0.3 mL AtIn Inject 0.3 mLs (0.3 mg total) into the muscle once. for 1 dose 2 each 3     No current facility-administered medications for this visit.     Chest xray 03/2024  Nothing acute       CTA chest 09/2023  MPRESSION:     1.  No pulmonary emboli seen. Negative exam.  Review of Systems  General: Feeling Well. Chronically fatigued, snores   Eyes: Vision is good.  ENT:  No sinusitis or pharyngitis.   Heart:: No chest pain or palpitations.  Lungs: No cough, sputum, or wheezing.  GI: No Nausea, vomiting, constipation, diarrhea, or reflux.  : wakes up 1 time a night to urinate .  Musculoskeletal: No joint pain or myalgias.  Skin: No lesions or rashes.  Neuro: headaches,  brain fog    Lymph: No edema or adenopathy.  Psych: No anxiety or depression.  Endo: No weight change.    OBJECTIVE:      /68 (BP Location: Right arm, Patient Position: Sitting, BP Method: Medium (Manual))   Pulse 73   Wt 105.1 kg (231 lb 9.6 oz)   LMP 03/21/2024 (Exact Date)   SpO2 99%   BMI 42.36 kg/m²     Physical Exam  GENERAL: younger patient in no distress.  HEENT: Pupils equal and reactive. Extraocular movements intact. Nose intact.    Mal[place]atti 2  Pharynx moist.  NECK:15 inches  Supple.   HEART: Regular rate and rhythm. No murmur or gallop auscultated.  LUNGS: Clear to auscultation and percussion. Lung excursion  symmetrical. No change in fremitus. No adventitial noises.  ABDOMEN: Bowel sounds present. Non-tender, no masses palpated.  EXTREMITIES: Normal muscle tone and joint movement, no cyanosis or clubbing.   LYMPHATICS: No adenopathy palpated, no edema.  SKIN: Dry, intact, no lesions.   NEURO: Cranial nerves II-XII intact. Motor strength 5/5 bilaterally, upper and lower extremities.  PSYCH: Appropriate affect.    Assessment:       1. Palpitations    2. Sleep apnea, unspecified type    3. Dyspnea, unspecified type    4. Snores    5. Chronic fatigue        Huggins score is 4    Plan:       PFT  Home sleep study    If PFT normal will do methacholine    Xopenex not covered under insurance   Follow up in 3 months   No follow-ups on file.

## 2024-05-27 ENCOUNTER — PROCEDURE VISIT (OUTPATIENT)
Dept: SLEEP MEDICINE | Facility: HOSPITAL | Age: 39
End: 2024-05-27
Attending: NURSE PRACTITIONER
Payer: COMMERCIAL

## 2024-05-27 DIAGNOSIS — R06.83 SNORES: ICD-10-CM

## 2024-05-27 DIAGNOSIS — R53.82 CHRONIC FATIGUE: ICD-10-CM

## 2024-05-27 DIAGNOSIS — R00.2 PALPITATIONS: ICD-10-CM

## 2024-05-27 PROCEDURE — 95806 SLEEP STUDY UNATT&RESP EFFT: CPT

## 2024-05-29 LAB
OHS QRS DURATION: 70 MS
OHS QTC CALCULATION: 456 MS

## 2024-05-30 ENCOUNTER — TELEPHONE (OUTPATIENT)
Dept: PULMONOLOGY | Facility: CLINIC | Age: 39
End: 2024-05-30

## 2024-05-30 ENCOUNTER — PATIENT MESSAGE (OUTPATIENT)
Dept: PULMONOLOGY | Facility: CLINIC | Age: 39
End: 2024-05-30

## 2024-05-30 NOTE — TELEPHONE ENCOUNTER
Left message informing patient her sleep study showed no signifcant sleep apnea.  If you have any questions please call us at [phone].

## 2024-06-03 ENCOUNTER — OFFICE VISIT (OUTPATIENT)
Dept: CARDIOLOGY | Facility: CLINIC | Age: 39
End: 2024-06-03
Payer: COMMERCIAL

## 2024-06-03 ENCOUNTER — LAB VISIT (OUTPATIENT)
Dept: LAB | Facility: HOSPITAL | Age: 39
End: 2024-06-03
Attending: NURSE PRACTITIONER
Payer: COMMERCIAL

## 2024-06-03 VITALS
DIASTOLIC BLOOD PRESSURE: 71 MMHG | OXYGEN SATURATION: 100 % | BODY MASS INDEX: 42.47 KG/M2 | SYSTOLIC BLOOD PRESSURE: 107 MMHG | HEIGHT: 62 IN | WEIGHT: 230.81 LBS | HEART RATE: 75 BPM

## 2024-06-03 DIAGNOSIS — R00.0 TACHYCARDIA: ICD-10-CM

## 2024-06-03 DIAGNOSIS — F41.9 ANXIETY: ICD-10-CM

## 2024-06-03 DIAGNOSIS — R00.2 PALPITATIONS: ICD-10-CM

## 2024-06-03 DIAGNOSIS — J45.20 MILD INTERMITTENT ASTHMA WITHOUT COMPLICATION: ICD-10-CM

## 2024-06-03 DIAGNOSIS — R07.9 CHEST PAIN, UNSPECIFIED TYPE: Primary | ICD-10-CM

## 2024-06-03 DIAGNOSIS — R30.0 DYSURIA: ICD-10-CM

## 2024-06-03 DIAGNOSIS — R30.0 DYSURIA: Primary | ICD-10-CM

## 2024-06-03 LAB
BILIRUB UR QL STRIP: NEGATIVE
CLARITY UR: CLEAR
COLOR UR: YELLOW
GLUCOSE UR QL STRIP: NEGATIVE
HGB UR QL STRIP: NEGATIVE
KETONES UR QL STRIP: NEGATIVE
LEUKOCYTE ESTERASE UR QL STRIP: NEGATIVE
NITRITE UR QL STRIP: NEGATIVE
PH UR STRIP: 7 [PH] (ref 5–8)
PROT UR QL STRIP: NEGATIVE
SP GR UR STRIP: 1.03 (ref 1–1.03)
URN SPEC COLLECT METH UR: NORMAL
UROBILINOGEN UR STRIP-ACNC: NEGATIVE EU/DL

## 2024-06-03 PROCEDURE — 3078F DIAST BP <80 MM HG: CPT | Mod: CPTII,S$GLB,, | Performed by: NURSE PRACTITIONER

## 2024-06-03 PROCEDURE — 99214 OFFICE O/P EST MOD 30 MIN: CPT | Mod: S$GLB,,, | Performed by: NURSE PRACTITIONER

## 2024-06-03 PROCEDURE — 1160F RVW MEDS BY RX/DR IN RCRD: CPT | Mod: CPTII,S$GLB,, | Performed by: NURSE PRACTITIONER

## 2024-06-03 PROCEDURE — 3008F BODY MASS INDEX DOCD: CPT | Mod: CPTII,S$GLB,, | Performed by: NURSE PRACTITIONER

## 2024-06-03 PROCEDURE — 99999 PR PBB SHADOW E&M-EST. PATIENT-LVL IV: CPT | Mod: PBBFAC,,, | Performed by: NURSE PRACTITIONER

## 2024-06-03 PROCEDURE — 3074F SYST BP LT 130 MM HG: CPT | Mod: CPTII,S$GLB,, | Performed by: NURSE PRACTITIONER

## 2024-06-03 PROCEDURE — 1159F MED LIST DOCD IN RCRD: CPT | Mod: CPTII,S$GLB,, | Performed by: NURSE PRACTITIONER

## 2024-06-03 PROCEDURE — 81003 URINALYSIS AUTO W/O SCOPE: CPT | Performed by: NURSE PRACTITIONER

## 2024-06-03 RX ORDER — SUMATRIPTAN SUCCINATE 100 MG/1
TABLET ORAL
Qty: 9 TABLET | Refills: 0 | Status: SHIPPED | OUTPATIENT
Start: 2024-06-03

## 2024-06-03 RX ORDER — PROPRANOLOL HYDROCHLORIDE 10 MG/1
10 TABLET ORAL 2 TIMES DAILY
Qty: 30 TABLET | Refills: 0 | Status: SHIPPED | OUTPATIENT
Start: 2024-06-03 | End: 2025-06-03

## 2024-06-03 NOTE — ASSESSMENT & PLAN NOTE
Has had good response w/ metoprolol  Took last dose metoprolol this AM  EKG NSR today in office    Stop metoprolol for now as pt is suspicious causing urinary symptoms  Start propranolol 10 mg PO PRN tachycardia  Symptoms aren't present daily and sound situational  Hx anxiety  Cardiac work up has been benign

## 2024-06-03 NOTE — PROGRESS NOTES
Subjective:    Patient ID:  Shalini Stephen is a 38 y.o. female patient here for evaluation Irregular Heart Beat and Medication Problem (Given  her UTI's )    History of Present Illness:     Pt recent started on metoprolol for tachycardia and palpitations; has worked good for her sx she states, but she is concerned causing UTI symptoms: Dysuria, right pelvic pain and frequency. I have discussed this is not likely caused by the metoprolol but she states the symptoms started when she started the metoprolol and decreased when she decreased the dosage to half. Pt would like to try another medication for the tachycardia, mainly occurs when she is at work. Agreeable to check UA as well., pt is sexually active    Non smoker, No ETOH  Exercises routinely; states sometimes heart rate will drop with exercise, non symptomatic, but this is disturbing for her. History of anxiety;  Stress, Echo and heart monitor work up have been benign  Had sleep study that ruled out sleep apnea        Most Recent Echocardiogram Results  Results for orders placed during the hospital encounter of 10/31/23    Echo    Interpretation Summary    Left Ventricle: The left ventricle is normal in size. Normal wall thickness. Normal wall motion. There is normal systolic function with a visually estimated ejection fraction of 55 - 60%. There is normal diastolic function. E/A ratio is 1.40. Average E/e' ratio is 8.96.    Right Ventricle: Normal right ventricular cavity size. Wall thickness is normal. Right ventricle wall motion  is normal. Systolic function is normal.    IVC/SVC: Normal venous pressure at 3 mmHg.      Most Recent Nuclear Stress Test Results  Results for orders placed during the hospital encounter of 10/09/23    Exercise Stress - EKG    Interpretation Summary    The patient exercised for 6 minutes 0 seconds on a Yaniv protocol, corresponding to a functional capacity of 7 METS, achieving a peak heart rate of 169 bpm, which is 97 % of the  age predicted maximum heart rate.    The ECG portion of the study is negative for ischemia.    The patient reported no chest pain during the stress test.    The blood pressure response to stress was normal.    There were no arrhythmias during stress.      Most Recent Cardiac PET Stress Test Results  No results found for this or any previous visit.      Most Recent Cardiovascular Angiogram results  No results found for this or any previous visit.      Other Most Recent Cardiology Results  Results for orders placed during the hospital encounter of 03/11/24    Cardiac Monitor - 3-15 Day Adult (Cupid Only)    Interpretation Summary    Predominant underlying rhythm was Sinus Rhythm.    Patient had a min HR of 57 bpm, max HR of 147 bpm, and avg HR of 86 bpm.    First Degree AV Block was present.    1 run of Supraventricular Tachycardia occurred lasting 10 beats with a max rate of 132 bpm (avg 108 bpm).    Isolated SVEs were rare (<1.0%, 22), SVE Couplets were rare (<1.0%, 4), and SVE Triplets were rare (<1.0%, 2).    Isolated VEs were rare (<1.0%, 4), and no VE Couplets or VE Triplets were present.    The patient complained of 15 episodes. The symptom(s) included chest pain, dizziness, palpitations, shortness of breath and heart racing. The corresponding rhythm to the patient reported event was normal sinus rhythm with a normal OR interval with rates varying from 75 bpm to 95 bpm and tachycardia with rates of 101 bpm to 109 bpm. These symptoms were associated with PACs.    There were 17 auto-triggered events corresponding with normal sinus with rates of 69 bpm to 99 bpm and sinus tachycardia rhythm with a rates of 106 bpm to 116 bpm. The symptom(s) included PACs and PVCs.    See full report    Benign moniter .    Patient had a min HR of 57 bpm, max HR of 147 bpm, and avg HR of 86 bpm. Predominant underlying rhythm was Sinus Rhythm. First Degree AV Block was present. 1 run of Supraventricular Tachycardia occurred lasting  10 beats with a max rate of 132 bpm (avg 108 bpm). Isolated SVEs were rare (<1.0%, 22), SVE Couplets were rare (<1.0%, 4), and SVE Triplets were rare (<1.0%, 2). Isolated VEs were rare (<1.0%, 4), and no VE Couplets or VE Triplets were present.      REVIEW OF SYSTEMS: As noted in HPI       Past Medical History:   Diagnosis Date    Allergy     Anemia, unspecified     Asthma      History reviewed. No pertinent surgical history.  Social History     Tobacco Use    Smoking status: Never    Smokeless tobacco: Never   Substance Use Topics    Alcohol use: Yes    Drug use: Not Currently         Objective      Vitals:    06/03/24 1028   BP: 107/71   Pulse: 75       LAST EKG  Results for orders placed or performed in visit on 04/03/24   IN OFFICE EKG 12-LEAD (to [place])    Collection Time: 04/03/24  3:40 PM   Result Value Ref Range    QRS Duration 70 ms    [facility] QTC Calculation 456 ms    Narrative    Test Reason : R07.9,    Vent. Rate : 085 BPM     Atrial Rate : 085 BPM     P-R Int : 166 ms          QRS Dur : 070 ms      QT Int : 384 ms       P-R-T Axes : 022 039 046 degrees     QTc Int : 456 ms    Normal sinus rhythm  Normal ECG  When compared with ECG of 05-MAR-2024 08:19,  Nonspecific T wave abnormality no longer evident in Lateral leads  Confirmed by Javier Kern MD ([id number]) on 5/29/2024 7:31:05 AM    Referred By:             Confirmed By:Javier Kern MD     LIPIDS - LAST 2   Lab Results   Component Value Date    CHOL 176 05/26/2023    CHOL 193 06/24/2020    HDL 57 05/26/2023    HDL 63 06/24/2020    LDLCALC 116.6 05/26/2023    LDLCALC 125.4 06/24/2020    TRIG 12 (L) 05/26/2023    TRIG 23 (L) 06/24/2020    CHOLHDL 32.4 05/26/2023    CHOLHDL 32.6 06/24/2020     CARDIAC PROFILE - LAST 2  Lab Results   Component Value Date    BNP 47 03/05/2024    BNP 14 09/21/2023      CBC - LAST 2  Lab Results   Component Value Date    WBC 8.13 03/05/2024    WBC 8.64 09/21/2023    RBC 4.36 03/05/2024    RBC 4.19 09/21/2023    HGB 11.9 (L)  03/05/2024    HGB 11.6 (L) 09/21/2023    HCT 37.3 03/05/2024    HCT 35.2 (L) 09/21/2023     03/05/2024     09/21/2023     Lab Results   Component Value Date    INR 1.0 03/05/2024     CHEMISTRY - LAST 2  Lab Results   Component Value Date     03/05/2024     09/21/2023    K 3.8 03/05/2024    K 3.7 09/21/2023     03/05/2024     09/21/2023    CO2 25 03/05/2024    CO2 26 09/21/2023    ANIONGAP 6 (L) 03/05/2024    ANIONGAP 8 09/21/2023    BUN 7 03/05/2024    BUN 9 09/21/2023    CREATININE 0.6 03/05/2024    CREATININE 0.6 09/21/2023    GLU 88 03/05/2024    GLU 91 09/21/2023    CALCIUM 8.5 (L) 03/05/2024    CALCIUM 8.8 09/21/2023    MG 1.8 03/05/2024    MG 1.7 09/21/2023    ALBUMIN 4.0 03/05/2024    ALBUMIN 4.1 09/21/2023    PROT 7.0 03/05/2024    PROT 7.2 09/21/2023    ALKPHOS 74 03/05/2024    ALKPHOS 64 09/21/2023    ALT 11 03/05/2024    ALT 12 09/21/2023    AST 12 03/05/2024    AST 14 09/21/2023    BILITOT 0.5 03/05/2024    BILITOT 0.1 09/21/2023      ENDOCRINE - LAST 2  Lab Results   Component Value Date    HGBA1C 5.3 05/26/2023    TSH 1.049 03/05/2024    TSH 0.552 09/21/2023        PHYSICAL EXAM  CONSTITUTIONAL: Well built, well nourished middle aged female in no apparent distress  NECK: no carotid bruit, no JVD  LUNGS: CTA  CHEST WALL: no tenderness  HEART: regular rate and rhythm, S1, S2 normal, no murmur  ABDOMEN: soft, non-tender; bowel sounds normal; no masses  EXTREMITIES: Extremities normal, no edema, no calf tenderness noted  NEURO: AAO X 3, speech clear, memory clear    I HAVE REVIEWED :    The vital signs, most recent cardiac testing, and most recent pertinent non-cardiology provider notes.    Current Outpatient Medications   Medication Instructions    ALPRAZolam (XANAX) 0.5 mg, Oral, Daily PRN    azelastine (ASTELIN) 137 mcg, Nasal, 2 times daily    budesonide (PULMICORT) 0.5 mg/2 mL nebulizer solution 1 vial into sinus rinse twice per day    EPINEPHrine (EPIPEN) 0.3 mg,  Intramuscular, Once    ferrous gluconate (FERGON) 324 mg, Oral, With breakfast    ipratropium (ATROVENT) 21 mcg (0.03 %) nasal spray 2 sprays, Each Nostril, 4 times daily    levalbuterol (XOPENEX HFA) 45 mcg/actuation inhaler 1-2 puffs, Inhalation, Every 4 hours PRN, Rescue    magnesium oxide (MAG-OX) 400 mg, Oral, Daily    propranoloL (INDERAL) 10 mg, Oral, 2 times daily    sumatriptan (IMITREX) 100 MG tablet TAKE ONE TABLET PO ONSET OF MIGRAIN MAX      Assessment & Plan     Tachycardia  Has had good response w/ metoprolol  Took last dose metoprolol this AM  EKG NSR today in office    Stop metoprolol for now as pt is suspicious causing urinary symptoms  Start propranolol 10 mg PO PRN tachycardia  Symptoms aren't present daily and sound situational  Hx anxiety  Cardiac work up has been benign    Palpitations  Noted improvement w/ metoprolol    Mild intermittent asthma without complication  Has not been using albuterol; understands this drug cause elevate the HR    Anxiety  Hx of anxiety; possible factor of her sx of tachycardia    BMI 40.0-44.9, adult  Low impact exercise advised    Dysuria  Check UA today  Will rx antibiotics if +/abnormal  Advised urinate after sex, drink plenty fluids and wipe front to back        No follow-ups on file.              RAMYA Meredith

## 2024-06-03 NOTE — ASSESSMENT & PLAN NOTE
Check UA today  Will rx antibiotics if +/abnormal  Advised urinate after sex, drink plenty fluids and wipe front to back

## 2024-06-03 NOTE — TELEPHONE ENCOUNTER
No care due was identified.  Health [facility] Embedded Care Due Messages. Reference number: [id number].   6/03/2024 9:38:28 AM CDT

## 2024-06-09 LAB
OHS QRS DURATION: 70 MS
OHS QTC CALCULATION: 431 MS

## 2024-06-17 ENCOUNTER — PATIENT OUTREACH (OUTPATIENT)
Dept: ADMINISTRATIVE | Facility: HOSPITAL | Age: 39
End: 2024-06-17
Payer: COMMERCIAL

## 2024-06-17 NOTE — PROGRESS NOTES
Population Health Chart Review & Patient Outreach Details      Additional [facility] Health Notes:               Updates Requested / Reviewed:      Updated Care Coordination Note, Care Everywhere, , and External Sources: [facility] and [employer]         Health Maintenance Topics Overdue:      VB Score: 1     Cervical Cancer Screening                       Health Maintenance Topic(s) Outreach Outcomes & Actions Taken:    Cervical Cancer Screening - Outreach Outcomes & Actions Taken  : Pap Smear/HPV Scheduled in Primary Care or OBGYN

## 2024-06-25 ENCOUNTER — TELEPHONE (OUTPATIENT)
Dept: OBSTETRICS AND GYNECOLOGY | Facility: CLINIC | Age: 39
End: 2024-06-25
Payer: COMMERCIAL

## 2024-06-28 ENCOUNTER — OFFICE VISIT (OUTPATIENT)
Dept: FAMILY MEDICINE | Facility: CLINIC | Age: 39
End: 2024-06-28
Payer: COMMERCIAL

## 2024-06-28 VITALS
BODY MASS INDEX: 42.55 KG/M2 | WEIGHT: 231.25 LBS | TEMPERATURE: 98 F | HEART RATE: 90 BPM | SYSTOLIC BLOOD PRESSURE: 116 MMHG | OXYGEN SATURATION: 99 % | HEIGHT: 62 IN | DIASTOLIC BLOOD PRESSURE: 80 MMHG

## 2024-06-28 DIAGNOSIS — R10.32 LEFT LOWER QUADRANT PAIN: ICD-10-CM

## 2024-06-28 DIAGNOSIS — Z13.220 SCREENING CHOLESTEROL LEVEL: ICD-10-CM

## 2024-06-28 DIAGNOSIS — Z13.1 SCREENING FOR DIABETES MELLITUS (DM): ICD-10-CM

## 2024-06-28 DIAGNOSIS — R10.31 RIGHT LOWER QUADRANT PAIN: Primary | ICD-10-CM

## 2024-06-28 DIAGNOSIS — I47.10 PSVT (PAROXYSMAL SUPRAVENTRICULAR TACHYCARDIA): ICD-10-CM

## 2024-06-28 DIAGNOSIS — R00.0 TACHYCARDIA: ICD-10-CM

## 2024-06-28 PROCEDURE — 1159F MED LIST DOCD IN RCRD: CPT | Mod: CPTII,S$GLB,, | Performed by: FAMILY MEDICINE

## 2024-06-28 PROCEDURE — 99999 PR PBB SHADOW E&M-EST. PATIENT-LVL IV: CPT | Mod: PBBFAC,,, | Performed by: FAMILY MEDICINE

## 2024-06-28 PROCEDURE — 3008F BODY MASS INDEX DOCD: CPT | Mod: CPTII,S$GLB,, | Performed by: FAMILY MEDICINE

## 2024-06-28 PROCEDURE — 3079F DIAST BP 80-89 MM HG: CPT | Mod: CPTII,S$GLB,, | Performed by: FAMILY MEDICINE

## 2024-06-28 PROCEDURE — 99214 OFFICE O/P EST MOD 30 MIN: CPT | Mod: S$GLB,,, | Performed by: FAMILY MEDICINE

## 2024-06-28 PROCEDURE — 3074F SYST BP LT 130 MM HG: CPT | Mod: CPTII,S$GLB,, | Performed by: FAMILY MEDICINE

## 2024-06-28 PROCEDURE — 1160F RVW MEDS BY RX/DR IN RCRD: CPT | Mod: CPTII,S$GLB,, | Performed by: FAMILY MEDICINE

## 2024-06-28 RX ORDER — METOPROLOL SUCCINATE 25 MG/1
12.5 TABLET, EXTENDED RELEASE ORAL DAILY
COMMUNITY
Start: 2024-06-27

## 2024-07-07 PROBLEM — I47.10 PSVT (PAROXYSMAL SUPRAVENTRICULAR TACHYCARDIA): Status: ACTIVE | Noted: 2024-07-07

## 2024-07-08 ENCOUNTER — HOSPITAL ENCOUNTER (OUTPATIENT)
Dept: RADIOLOGY | Facility: HOSPITAL | Age: 39
Discharge: HOME | End: 2024-07-08
Attending: FAMILY MEDICINE
Payer: COMMERCIAL

## 2024-07-08 ENCOUNTER — LAB VISIT (OUTPATIENT)
Dept: LAB | Facility: HOSPITAL | Age: 39
End: 2024-07-08
Attending: FAMILY MEDICINE
Payer: COMMERCIAL

## 2024-07-08 DIAGNOSIS — Z13.1 SCREENING FOR DIABETES MELLITUS (DM): ICD-10-CM

## 2024-07-08 DIAGNOSIS — R10.32 LEFT LOWER QUADRANT PAIN: ICD-10-CM

## 2024-07-08 DIAGNOSIS — R00.0 TACHYCARDIA: ICD-10-CM

## 2024-07-08 DIAGNOSIS — R10.31 RIGHT LOWER QUADRANT PAIN: ICD-10-CM

## 2024-07-08 DIAGNOSIS — Z13.220 SCREENING CHOLESTEROL LEVEL: ICD-10-CM

## 2024-07-08 DIAGNOSIS — I47.10 PSVT (PAROXYSMAL SUPRAVENTRICULAR TACHYCARDIA): ICD-10-CM

## 2024-07-08 LAB
ALBUMIN SERPL BCP-MCNC: 3.9 G/DL (ref 3.5–5.2)
ALP SERPL-CCNC: 68 U/L (ref 55–135)
ALT SERPL W/O P-5'-P-CCNC: 12 U/L (ref 10–44)
ANION GAP SERPL CALC-SCNC: 5 MMOL/L (ref 8–16)
AST SERPL-CCNC: 12 U/L (ref 10–40)
BASOPHILS # BLD AUTO: 0.05 K/UL (ref 0–0.2)
BASOPHILS NFR BLD: 0.6 % (ref 0–1.9)
BILIRUB SERPL-MCNC: 0.4 MG/DL (ref 0.1–1)
BUN SERPL-MCNC: 10 MG/DL (ref 6–20)
CALCIUM SERPL-MCNC: 8.4 MG/DL (ref 8.7–10.5)
CHLORIDE SERPL-SCNC: 106 MMOL/L (ref 95–110)
CHOLEST SERPL-MCNC: 169 MG/DL (ref 120–199)
CHOLEST/HDLC SERPL: 3.1 {RATIO} (ref 2–5)
CO2 SERPL-SCNC: 30 MMOL/L (ref 23–29)
CREAT SERPL-MCNC: 0.7 MG/DL (ref 0.5–1.4)
DIFFERENTIAL METHOD BLD: ABNORMAL
EOSINOPHIL # BLD AUTO: 0.2 K/UL (ref 0–0.5)
EOSINOPHIL NFR BLD: 2 % (ref 0–8)
ERYTHROCYTE [DISTWIDTH] IN BLOOD BY AUTOMATED COUNT: 14.3 % (ref 11.5–14.5)
EST. GFR  (NO RACE VARIABLE): >60 ML/MIN/1.73 M^2
ESTIMATED AVG GLUCOSE: 108 MG/DL (ref 68–131)
GLUCOSE SERPL-MCNC: 83 MG/DL (ref 70–110)
HBA1C MFR BLD: 5.4 % (ref 4.5–6.2)
HCT VFR BLD AUTO: 36 % (ref 37–48.5)
HDLC SERPL-MCNC: 54 MG/DL (ref 40–75)
HDLC SERPL: 32 % (ref 20–50)
HGB BLD-MCNC: 11.6 G/DL (ref 12–16)
IMM GRANULOCYTES # BLD AUTO: 0.04 K/UL (ref 0–0.04)
IMM GRANULOCYTES NFR BLD AUTO: 0.5 % (ref 0–0.5)
LDLC SERPL CALC-MCNC: 104.4 MG/DL (ref 63–159)
LYMPHOCYTES # BLD AUTO: 2.2 K/UL (ref 1–4.8)
LYMPHOCYTES NFR BLD: 25.7 % (ref 18–48)
MCH RBC QN AUTO: 27.4 PG (ref 27–31)
MCHC RBC AUTO-ENTMCNC: 32.2 G/DL (ref 32–36)
MCV RBC AUTO: 85 FL (ref 82–98)
MONOCYTES # BLD AUTO: 0.8 K/UL (ref 0.3–1)
MONOCYTES NFR BLD: 9.5 % (ref 4–15)
NEUTROPHILS # BLD AUTO: 5.3 K/UL (ref 1.8–7.7)
NEUTROPHILS NFR BLD: 61.7 % (ref 38–73)
NONHDLC SERPL-MCNC: 115 MG/DL
NRBC BLD-RTO: 0 /100 WBC
PLATELET # BLD AUTO: 399 K/UL (ref 150–450)
PMV BLD AUTO: 10.3 FL (ref 9.2–12.9)
POTASSIUM SERPL-SCNC: 3.9 MMOL/L (ref 3.5–5.1)
PROT SERPL-MCNC: 6.8 G/DL (ref 6–8.4)
RBC # BLD AUTO: 4.23 M/UL (ref 4–5.4)
SODIUM SERPL-SCNC: 141 MMOL/L (ref 136–145)
TRIGL SERPL-MCNC: 53 MG/DL (ref 30–150)
WBC # BLD AUTO: 8.64 K/UL (ref 3.9–12.7)

## 2024-07-08 PROCEDURE — 25500020 PHARM REV CODE 255

## 2024-07-08 PROCEDURE — 80053 COMPREHEN METABOLIC PANEL: CPT | Performed by: FAMILY MEDICINE

## 2024-07-08 PROCEDURE — 83036 HEMOGLOBIN GLYCOSYLATED A1C: CPT | Performed by: FAMILY MEDICINE

## 2024-07-08 PROCEDURE — 80061 LIPID PANEL: CPT | Performed by: FAMILY MEDICINE

## 2024-07-08 PROCEDURE — A9698 NON-RAD CONTRAST MATERIALNOC: HCPCS

## 2024-07-08 PROCEDURE — 74177 CT ABD & PELVIS W/CONTRAST: CPT | Mod: 26,,, | Performed by: RADIOLOGY

## 2024-07-08 PROCEDURE — 85025 COMPLETE CBC W/AUTO DIFF WBC: CPT | Performed by: FAMILY MEDICINE

## 2024-07-08 PROCEDURE — 36415 COLL VENOUS BLD VENIPUNCTURE: CPT | Performed by: FAMILY MEDICINE

## 2024-07-08 PROCEDURE — 74177 CT ABD & PELVIS W/CONTRAST: CPT | Mod: TC

## 2024-07-08 RX ADMIN — IOHEXOL 1000 ML: 12 SOLUTION ORAL at 09:07

## 2024-07-08 RX ADMIN — IOHEXOL 100 ML: 350 INJECTION, SOLUTION INTRAVENOUS at 09:07

## 2024-07-08 NOTE — PROGRESS NOTES
Subjective:       Patient ID: Shalini Stephen is a 38 y.o. female.    Chief Complaint: Follow-up (6wk )    BMI of 42.  Has PSVT some tachycardia.  On metoprolol.  Felt like she got right lower quadrant pain from it.  Possibly related to the metoprolol.  Uses it every other day.  No nausea vomiting or diarrhea.  BNP normal.  TSH normal hemoglobin 11.9.  Far as the right lower quadrant pain goes cycles are okay Pap is due.  No nausea vomiting or diarrhea.    Physical examination.  Vital signs noted.  No acute distress neck without bruit.  No adenopathy.  Chest clear.  Heart regular rate rhythm.  Abdomen bowel sounds are positive tender right lower quadrant but no guarding or rebound.  Extremities are without edema positive pedal pulses.        Objective:        Assessment:       1. Right lower quadrant pain    2. BMI 40.0-44.9, adult    3. Tachycardia    4. PSVT (paroxysmal supraventricular tachycardia)    5. Left lower quadrant pain    6. Screening for diabetes mellitus (DM)    7. Screening cholesterol level        Plan:       Right lower quadrant pain  -     CT Abdomen Pelvis With IV Contrast Routine Oral Contrast; Future; Expected date: 06/28/2024  -     CBC Auto Differential; Future; Expected date: 07/12/2024  -     Urine culture  -     Urinalysis    BMI 40.0-44.9, adult    Tachycardia  -     CBC Auto Differential; Future; Expected date: 07/12/2024  -     Comprehensive Metabolic Panel; Future; Expected date: 07/12/2024    PSVT (paroxysmal supraventricular tachycardia)  -     CBC Auto Differential; Future; Expected date: 07/12/2024    Left lower quadrant pain  -     CT Abdomen Pelvis With IV Contrast Routine Oral Contrast; Future; Expected date: 06/28/2024  -     Comprehensive Metabolic Panel; Future; Expected date: 07/12/2024    Screening for diabetes mellitus (DM)  -     Comprehensive Metabolic Panel; Future; Expected date: 07/12/2024  -     Hemoglobin A1C; Future; Expected date: 07/12/2024    Screening  cholesterol level  -     Lipid Panel; Future; Expected date: 07/12/2024      Go for Pap smear.  Get a CT of the abdomen pelvis with contrast.  CBC CMP lipids A1c ordered.  Okay for her to hold the metoprolol.  Get a urinalysis and urine culture and sensitivity.

## 2024-07-15 ENCOUNTER — OFFICE VISIT (OUTPATIENT)
Dept: FAMILY MEDICINE | Facility: CLINIC | Age: 39
End: 2024-07-15
Payer: COMMERCIAL

## 2024-07-15 VITALS
RESPIRATION RATE: 18 BRPM | TEMPERATURE: 98 F | OXYGEN SATURATION: 99 % | HEIGHT: 62 IN | WEIGHT: 231.13 LBS | SYSTOLIC BLOOD PRESSURE: 110 MMHG | BODY MASS INDEX: 42.53 KG/M2 | DIASTOLIC BLOOD PRESSURE: 82 MMHG | HEART RATE: 84 BPM

## 2024-07-15 DIAGNOSIS — H66.91 RIGHT OTITIS MEDIA, UNSPECIFIED OTITIS MEDIA TYPE: ICD-10-CM

## 2024-07-15 DIAGNOSIS — R09.82 POST-NASAL DRIP: ICD-10-CM

## 2024-07-15 DIAGNOSIS — H61.21 IMPACTED CERUMEN OF RIGHT EAR: Primary | ICD-10-CM

## 2024-07-15 DIAGNOSIS — J02.9 SORE THROAT: ICD-10-CM

## 2024-07-15 PROCEDURE — 1159F MED LIST DOCD IN RCRD: CPT | Mod: CPTII,S$GLB,,

## 2024-07-15 PROCEDURE — 1160F RVW MEDS BY RX/DR IN RCRD: CPT | Mod: CPTII,S$GLB,,

## 2024-07-15 PROCEDURE — 3074F SYST BP LT 130 MM HG: CPT | Mod: CPTII,S$GLB,,

## 2024-07-15 PROCEDURE — 3079F DIAST BP 80-89 MM HG: CPT | Mod: CPTII,S$GLB,,

## 2024-07-15 PROCEDURE — 3008F BODY MASS INDEX DOCD: CPT | Mod: CPTII,S$GLB,,

## 2024-07-15 PROCEDURE — 99214 OFFICE O/P EST MOD 30 MIN: CPT | Mod: S$GLB,,,

## 2024-07-15 PROCEDURE — 99999 PR PBB SHADOW E&M-EST. PATIENT-LVL IV: CPT | Mod: PBBFAC,,,

## 2024-07-15 PROCEDURE — 3044F HG A1C LEVEL LT 7.0%: CPT | Mod: CPTII,S$GLB,,

## 2024-07-15 RX ORDER — FLUTICASONE PROPIONATE 50 MCG
1 SPRAY, SUSPENSION (ML) NASAL DAILY
Qty: 16 G | Refills: 0 | Status: SHIPPED | OUTPATIENT
Start: 2024-07-15

## 2024-07-15 RX ORDER — AMOXICILLIN 875 MG/1
875 TABLET, FILM COATED ORAL EVERY 12 HOURS
Qty: 14 TABLET | Refills: 0 | Status: SHIPPED | OUTPATIENT
Start: 2024-07-15 | End: 2024-07-22

## 2024-07-15 RX ORDER — LORATADINE 10 MG/1
10 TABLET ORAL DAILY
Qty: 30 TABLET | Refills: 0 | Status: SHIPPED | OUTPATIENT
Start: 2024-07-15 | End: 2025-07-15

## 2024-07-15 RX ORDER — AMOXICILLIN AND CLAVULANATE POTASSIUM 875; 125 MG/1; MG/1
1 TABLET, FILM COATED ORAL EVERY 12 HOURS
Qty: 14 TABLET | Refills: 0 | Status: CANCELLED | OUTPATIENT
Start: 2024-07-15 | End: 2024-07-22

## 2024-07-15 NOTE — PROGRESS NOTES
Subjective:       Patient ID: Shalini Stephen is a 38 y.o. female.    Chief Complaint: Sore Throat (X 5 days) and Otalgia (right)    Shalini Stephen 38-year-old female patient who presents to clinic today with complaints of sore throat and ear pain.  Patient states she has had a sore throat and pain in her right ear for 5 days.  Sore throat and earache or worse at night.  Her voice has been going in and out.  She denies fever or chills.  She does have some sinus drip.  No known exposure to illness.  She is getting ready to go out of town and concerned.  She does have a history of mild intermittent asthma and uses albuterol as needed. She has no wheezing or shortness of breath.       Review of patient's allergies indicates:   Allergen Reactions    Shellfish containing products     Vicodin [hydrocodone-acetaminophen] Nausea And Vomiting     Social Determinants of Health     Tobacco Use: Low Risk  (7/15/2024)    Patient History     Smoking Tobacco Use: Never     Smokeless Tobacco Use: Never     Passive Exposure: Not on file   Alcohol Use: Not on file   Financial Resource Strain: Not on file   Food Insecurity: Not on file   Transportation Needs: Not on file   Physical Activity: Not on file   Stress: No Stress Concern Present (6/24/2020)    [employer] - Occupational Stress Questionnaire     Feeling of Stress : Not at all   Housing Stability: Not on file   Depression: Low Risk  (4/15/2024)    Depression     Last PHQ-4: Flowsheet Data: 0   Utilities: Not on file   Health Literacy: Not on file   Social Isolation: Not on file      Past Medical History:   Diagnosis Date    Allergy     Anemia, unspecified     Asthma       History reviewed. No pertinent surgical history.   Social History     Socioeconomic History    Marital status:          Current Outpatient Medications:     ALPRAZolam (XANAX) 0.5 MG tablet, Take 1 tablet (0.5 mg total) by mouth daily as needed for Anxiety.,  Disp: 30 tablet, Rfl: 0    azelastine (ASTELIN) 137 mcg (0.1 %) nasal spray, 1 spray (137 mcg total) by Nasal route 2 (two) times daily., Disp: 30 mL, Rfl: 3    budesonide (PULMICORT) 0.5 mg/2 mL nebulizer solution, 1 vial into sinus rinse twice per day, Disp: 120 mL, Rfl: 3    EPINEPHrine (EPIPEN) 0.3 mg/0.3 mL AtIn, Inject 0.3 mLs (0.3 mg total) into the muscle once. for 1 dose, Disp: 2 each, Rfl: 3    ferrous gluconate (FERGON) 324 MG tablet, Take 324 mg by mouth daily with breakfast., Disp: , Rfl:     ipratropium (ATROVENT) 21 mcg (0.03 %) nasal spray, 2 sprays by Each Nostril route 4 (four) times daily., Disp: 30 mL, Rfl: 3    levalbuterol (XOPENEX HFA) 45 mcg/actuation inhaler, Inhale 1-2 puffs into the lungs every 4 (four) hours as needed for Wheezing. Rescue, Disp: 15 g, Rfl: 0    magnesium oxide (MAG-OX) 400 mg (241.3 mg magnesium) tablet, Take 1 tablet (400 mg total) by mouth once daily., Disp: , Rfl: 0    propranoloL (INDERAL) 10 MG tablet, Take 1 tablet (10 mg total) by mouth 2 (two) times daily., Disp: 30 tablet, Rfl: 0    sumatriptan (IMITREX) 100 MG tablet, TAKE ONE TABLET PO ONSET OF MIGRAIN MAX, Disp: 9 tablet, Rfl: 0    amoxicillin (AMOXIL) 875 MG tablet, Take 1 tablet (875 mg total) by mouth every 12 (twelve) hours. for 7 days, Disp: 14 tablet, Rfl: 0    fluticasone propionate (FLONASE) 50 mcg/actuation nasal spray, 1 spray (50 mcg total) by Each Nostril route once daily., Disp: 16 g, Rfl: 0    loratadine (CLARITIN) 10 mg tablet, Take 1 tablet (10 mg total) by mouth once daily., Disp: 30 tablet, Rfl: 0    metoprolol succinate (TOPROL-XL) 25 MG 24 hr tablet, Take 12.5 mg by mouth once daily. (Patient not taking: Reported on 6/28/2024), Disp: , Rfl:     Lab Results   Component Value Date    WBC 8.64 07/08/2024    HGB 11.6 (L) 07/08/2024    HCT 36.0 (L) 07/08/2024     07/08/2024    CHOL 169 07/08/2024    TRIG 53 07/08/2024    HDL 54 07/08/2024    ALT 12 07/08/2024    AST 12  07/08/2024     07/08/2024    K 3.9 07/08/2024     07/08/2024    CREATININE 0.7 07/08/2024    BUN 10 07/08/2024    CO2 30 (H) 07/08/2024    TSH 1.049 03/05/2024    INR 1.0 03/05/2024    HGBA1C 5.4 07/08/2024       Review of Systems   Constitutional:  Negative for chills and fever.   HENT:  Positive for ear pain, postnasal drip, sore throat and voice change. Negative for sinus pressure/congestion.    Respiratory:  Negative for cough, shortness of breath and wheezing.    Cardiovascular:  Negative for chest pain and palpitations.       Objective:      Physical Exam  Constitutional:       Appearance: Normal appearance.   HENT:      Right Ear: A middle ear effusion is present. There is impacted cerumen. Tympanic membrane is erythematous and bulging.      Left Ear: Tympanic membrane normal.      Mouth/Throat:      Pharynx: Oropharynx is clear. Posterior oropharyngeal erythema present.   Eyes:      Conjunctiva/sclera: Conjunctivae normal.   Cardiovascular:      Rate and Rhythm: Normal rate and regular rhythm.      Pulses: Normal pulses.      Heart sounds: Normal heart sounds.   Pulmonary:      Effort: Pulmonary effort is normal.      Breath sounds: Normal breath sounds.   Lymphadenopathy:      Cervical: No cervical adenopathy.   Neurological:      Mental Status: She is alert.   Psychiatric:         Mood and Affect: Mood normal.         Behavior: Behavior normal.         Thought Content: Thought content normal.         Judgment: Judgment normal.       Assessment:       1. Impacted cerumen of right ear    2. Sore throat    3. Post-nasal drip    4. Right otitis media, unspecified otitis media type        Plan:       Shalini was seen today for sore throat and otalgia.    Diagnoses and all orders for this visit:    Impacted cerumen of right ear  -     Ear wax removal    Sore throat    Post-nasal drip  -     loratadine (CLARITIN) 10 mg tablet; Take 1 tablet (10 mg total) by mouth once daily.  -     fluticasone  propionate (FLONASE) 50 mcg/actuation nasal spray; 1 spray (50 mcg total) by Each Nostril route once daily.    Right otitis media, unspecified otitis media type  -     amoxicillin (AMOXIL) 875 MG tablet; Take 1 tablet (875 mg total) by mouth every 12 (twelve) hours. for 7 days    Other orders  The following orders have not been finalized:  -     Cancel: amoxicillin-clavulanate 875-125mg (AUGMENTIN) 875-125 mg per tablet    Right ear otitis media   - patient does not tolerate Augmentin but has done okay with plain amoxicillin.  We will do amoxicillin 875 b.i.d. x7 days.    Postnasal drip   - Claritin and Flonase as direct    Follow-up in clinic if symptoms worsen or do not improve.

## 2024-07-31 RX ORDER — PROPRANOLOL HYDROCHLORIDE 10 MG/1
10 TABLET ORAL 2 TIMES DAILY
Qty: 180 TABLET | Refills: 3 | Status: SHIPPED | OUTPATIENT
Start: 2024-07-31 | End: 2025-07-31

## 2024-08-04 RX ORDER — SUMATRIPTAN SUCCINATE 100 MG/1
TABLET ORAL
Qty: 9 TABLET | Refills: 0 | Status: SHIPPED | OUTPATIENT
Start: 2024-08-04

## 2024-08-20 ENCOUNTER — TELEPHONE (OUTPATIENT)
Dept: OBSTETRICS AND GYNECOLOGY | Facility: CLINIC | Age: 39
End: 2024-08-20
Payer: COMMERCIAL

## 2024-09-03 ENCOUNTER — TELEPHONE (OUTPATIENT)
Dept: OBSTETRICS AND GYNECOLOGY | Facility: CLINIC | Age: 39
End: 2024-09-03
Payer: COMMERCIAL

## 2024-12-26 ENCOUNTER — OFFICE VISIT (OUTPATIENT)
Dept: FAMILY MEDICINE | Facility: CLINIC | Age: 39
End: 2024-12-26
Payer: COMMERCIAL

## 2024-12-26 VITALS
SYSTOLIC BLOOD PRESSURE: 112 MMHG | WEIGHT: 235.56 LBS | TEMPERATURE: 98 F | DIASTOLIC BLOOD PRESSURE: 72 MMHG | OXYGEN SATURATION: 100 % | BODY MASS INDEX: 43.35 KG/M2 | RESPIRATION RATE: 18 BRPM | HEIGHT: 62 IN | HEART RATE: 123 BPM

## 2024-12-26 DIAGNOSIS — R09.82 POST-NASAL DRIP: ICD-10-CM

## 2024-12-26 DIAGNOSIS — R00.0 TACHYCARDIA: ICD-10-CM

## 2024-12-26 DIAGNOSIS — J45.21 MILD INTERMITTENT ASTHMA WITH (ACUTE) EXACERBATION: Primary | ICD-10-CM

## 2024-12-26 DIAGNOSIS — J98.01 COUGH DUE TO BRONCHOSPASM: ICD-10-CM

## 2024-12-26 DIAGNOSIS — H69.91 EUSTACHIAN TUBE DISORDER, RIGHT: ICD-10-CM

## 2024-12-26 PROCEDURE — 99999 PR PBB SHADOW E&M-EST. PATIENT-LVL IV: CPT | Mod: PBBFAC,,, | Performed by: NURSE PRACTITIONER

## 2024-12-26 RX ORDER — FLUTICASONE PROPIONATE 50 MCG
1 SPRAY, SUSPENSION (ML) NASAL DAILY
Qty: 16 G | Refills: 0 | Status: SHIPPED | OUTPATIENT
Start: 2024-12-26

## 2024-12-26 RX ORDER — AZELASTINE 1 MG/ML
1 SPRAY, METERED NASAL 2 TIMES DAILY
Qty: 30 ML | Refills: 3 | Status: SHIPPED | OUTPATIENT
Start: 2024-12-26 | End: 2025-12-26

## 2024-12-26 RX ORDER — PREDNISONE 20 MG/1
20 TABLET ORAL DAILY
Qty: 5 TABLET | Refills: 0 | Status: SHIPPED | OUTPATIENT
Start: 2024-12-26

## 2024-12-26 RX ORDER — BUDESONIDE 0.5 MG/2ML
INHALANT ORAL
Qty: 120 ML | Refills: 3 | Status: SHIPPED | OUTPATIENT
Start: 2024-12-26

## 2024-12-26 NOTE — PROGRESS NOTES
Subjective:       Patient ID: Shalini Stephen is a 39 y.o. female.    Chief Complaint: Fever, Cough, Chest Congestion (X 1 week), and Headache    HPI    Pt complains of nasal congestion, cough, burning chest, headache x 1 week.  She has low grade fever of 99 yesterday, but no other fevers.  Taking 325mg tylenol prn with mild relief.  She has hx of asthma. Has pulmicort nebs which she has not been taking.  Also NOT taking albuterol due to tachycardia, has not been using claritin or nasal sprays for current symptoms.    Tachycardic- seeing cards for tachycardia and 1st degree av block. Currently taking propranolol, sees cardiology in 4 days and she may be switched back to metoprolol. Her cardiologist told her not to take albuterol unless she really needs to. Pt states HR is currently much better than normal.   Review of patient's allergies indicates:   Allergen Reactions    Shellfish containing products     Vicodin [hydrocodone-acetaminophen] Nausea And Vomiting     Social Drivers of Health     Tobacco Use: Low Risk  (12/26/2024)    Patient History     Smoking Tobacco Use: Never     Smokeless Tobacco Use: Never     Passive Exposure: Not on file   Alcohol Use: Not on file   Financial Resource Strain: Not on file   Food Insecurity: Not on file   Transportation Needs: Not on file   Physical Activity: Not on file   Stress: No Stress Concern Present (6/24/2020)    [employer] - Occupational Stress Questionnaire     Feeling of Stress : Not at all   Housing Stability: Not on file   Depression: Low Risk  (4/15/2024)    Depression     Last PHQ-4: Flowsheet Data: 0   Utilities: Not on file   Health Literacy: Not on file   Social Isolation: Not on file      Past Medical History:   Diagnosis Date    Allergy     Anemia, unspecified     Asthma     AV block, 1st degree     Migraines     Tachycardia       No past surgical history on file.   Social History     Socioeconomic History    Marital status:           Current Outpatient Medications:     ALPRAZolam (XANAX) 0.5 MG tablet, Take 1 tablet (0.5 mg total) by mouth daily as needed for Anxiety., Disp: 30 tablet, Rfl: 0    EPINEPHrine (EPIPEN) 0.3 mg/0.3 mL AtIn, Inject 0.3 mLs (0.3 mg total) into the muscle once. for 1 dose, Disp: 2 each, Rfl: 3    ferrous gluconate (FERGON) 324 MG tablet, Take 324 mg by mouth daily with breakfast., Disp: , Rfl:     ipratropium (ATROVENT) 21 mcg (0.03 %) nasal spray, 2 sprays by Each Nostril route 4 (four) times daily., Disp: 30 mL, Rfl: 3    levalbuterol (XOPENEX HFA) 45 mcg/actuation inhaler, Inhale 1-2 puffs into the lungs every 4 (four) hours as needed for Wheezing. Rescue, Disp: 15 g, Rfl: 0    loratadine (CLARITIN) 10 mg tablet, Take 1 tablet (10 mg total) by mouth once daily., Disp: 30 tablet, Rfl: 0    magnesium oxide (MAG-OX) 400 mg (241.3 mg magnesium) tablet, Take 1 tablet (400 mg total) by mouth once daily., Disp: , Rfl: 0    propranoloL (INDERAL) 10 MG tablet, Take 1 tablet (10 mg total) by mouth 2 (two) times daily., Disp: 180 tablet, Rfl: 3    sumatriptan (IMITREX) 100 MG tablet, TAKE ONE TABLET PO ONSET OF MIGRAIN MAX, Disp: 9 tablet, Rfl: 0    azelastine (ASTELIN) 137 mcg (0.1 %) nasal spray, 1 spray (137 mcg total) by Nasal route 2 (two) times daily., Disp: 30 mL, Rfl: 3    budesonide (PULMICORT) 0.5 mg/2 mL nebulizer solution, 1 vial into sinus rinse twice per day, Disp: 120 mL, Rfl: 3    fluticasone propionate (FLONASE) 50 mcg/actuation nasal spray, 1 spray (50 mcg total) by Each Nostril route once daily., Disp: 16 g, Rfl: 0    metoprolol succinate (TOPROL-XL) 25 MG 24 hr tablet, Take 12.5 mg by mouth once daily. (Patient not taking: Reported on 12/26/2024), Disp: , Rfl:     predniSONE (DELTASONE) 20 MG tablet, Take 1 tablet (20 mg total) by mouth once daily., Disp: 5 tablet, Rfl: 0    Review of Systems   Constitutional:  Negative for chills, diaphoresis, fever and night sweats.   HENT:  Positive for  "nasal congestion, postnasal drip, rhinorrhea and sore throat.    Respiratory:  Positive for cough. Negative for shortness of breath and wheezing.    Cardiovascular:  Negative for chest pain and palpitations.   Gastrointestinal:  Negative for abdominal pain and constipation.   Genitourinary:  Negative for frequency.   Neurological:  Negative for dizziness, speech difficulty and light-headedness.   Psychiatric/Behavioral:  Negative for suicidal ideas.        Objective:      Vitals:    12/26/24 0740   BP: 112/72   BP Location: Left arm   Patient Position: Sitting   Pulse: (!) 123   Resp: 18   Temp: 97.6 °F (36.4 °C)   SpO2: 100%   Weight: 106.8 kg (235 lb 9 oz)   Height: 5' 2" (1.575 m)      Physical Exam  Constitutional:       General: She is not in acute distress.     Appearance: Normal appearance. She is obese.   HENT:      Head: Normocephalic and atraumatic.      Right Ear: Ear canal and external ear normal. A middle ear effusion is present.      Left Ear: Tympanic membrane, ear canal and external ear normal.      Mouth/Throat:      Pharynx: No oropharyngeal exudate or posterior oropharyngeal erythema.   Eyes:      Conjunctiva/sclera: Conjunctivae normal.   Cardiovascular:      Rate and Rhythm: Normal rate and regular rhythm.   Pulmonary:      Effort: Pulmonary effort is normal. No tachypnea, accessory muscle usage or respiratory distress.      Breath sounds: Normal breath sounds. No wheezing, rhonchi or rales.   Abdominal:      General: Bowel sounds are normal. There is no distension.      Palpations: There is no mass.      Tenderness: There is no abdominal tenderness.   Musculoskeletal:      Right lower leg: No edema.      Left lower leg: No edema.   Lymphadenopathy:      Cervical: No cervical adenopathy.   Neurological:      Mental Status: She is alert and oriented to person, place, and time.   Psychiatric:         Behavior: Behavior normal.         Assessment:       1. Mild intermittent asthma with (acute) " exacerbation    2. Cough due to bronchospasm    3. Post-nasal drip    4. Eustachian tube disorder, right    5. Tachycardia        Plan:       Shalini was seen today for fever, cough, chest congestion and headache.    Diagnoses and all orders for this visit:    Mild intermittent asthma with (acute) exacerbation    Cough due to bronchospasm  -     predniSONE (DELTASONE) 20 MG tablet; Take 1 tablet (20 mg total) by mouth once daily.  -     budesonide (PULMICORT) 0.5 mg/2 mL nebulizer solution; 1 vial into sinus rinse twice per day    Isntructed to :   Take claritin daily, take tylenol 1000mg q8h prn  Post-nasal drip  -     fluticasone propionate (FLONASE) 50 mcg/actuation nasal spray; 1 spray (50 mcg total) by Each Nostril route once daily.  -     azelastine (ASTELIN) 137 mcg (0.1 %) nasal spray; 1 spray (137 mcg total) by Nasal route 2 (two) times daily.    Eustachian tube disorder, right     -     fluticasone propionate (FLONASE) 50 mcg/actuation nasal spray; 1 spray (50 mcg total) by Each Nostril route once daily.  -     azelastine (ASTELIN) 137 mcg (0.1 %) nasal spray; 1 spray (137 mcg total) by Nasal route 2 (two) times daily.      Tachycardia  -improved, see cardiology on Monday  -continue current medications  -continue to avoid beta agonist if possible.       F/u as needed. Patient verbalized understanding and agrees with plan.

## 2024-12-26 NOTE — LETTER
December 26, 2024      Dr. Mathis - [facility]  [address]  [address]  [facility] [address]  Phone: [phone]  Fax: [phone]       Patient: Shalini Stephen   YOB: 1985  Date of Visit: 12/26/2024    To Whom It May Concern:    Silvina Stephen  was at [facility] on 12/26/2024. The patient may return to work on  12/30/2024with no restrictions. If you have any questions or concerns, or if I can be of further assistance, please do not hesitate to contact me.    Sincerely,        Smiley Engel

## 2024-12-26 NOTE — PATIENT INSTRUCTIONS
AT HOME TREATMENT FOR COMMON COLD    -The common cold is caused by a virus.  A virus does not respond to antibiotics.      -Antibiotics kill the good bacteria in our body which can cause different fungal and bacterial infections such as oral thrush, c.diff, bacterial vaginosis, bacterial vaginitis, irritable bowels   -this is why we avoid using antibiotics unless we absolutely need to use them     -Symptomatic treatment for cough and cold:  -RAW HONEY:  Honey has a lot of natural medicinal benefits.  If you have a sore throat,  or cough, take a teaspoon of RAW honey (must say raw on the label) and swallow it room temperature. Adding it to tea will deactivate the natural medicinal properties.      -SALT WATER GARGLE:  Post-nasal drip will cause irritation and cough, use 1 teaspoon of salt in 1 cup of warm water, take a small sip without swallowing, tilt your head back , open your mouth and gargle for 15 seconds, spit the solution into the sink.     -ANTIHISTAMINES:  sometimes over the counter allergy medications can help to dry up the mucus, Zyrtec or Claritin    -Tylenol (acetaminophen):  Instead of taking cough/cold medicine with small amounts of tylenol in them, take a full dose of tylenol 1000mg 2-3 times per day for pain and fever.     -Drink lots of water and avoid alcohol   -staying hydrated helps your body flush out the virus    -Rest   -help your immune system fight the virus      -If symptoms last longer than 14 DAYS, or you develop difficulty breathing, white opaque drainage from your nose or cough, or high fever that does not improve with medication,  return to clinic for further evaluation.      **studies show that in otherwise healthy children and adults, antibiotics for bacterial bronchitis, do not decrease length of illness

## 2024-12-27 DIAGNOSIS — H66.91 RIGHT OTITIS MEDIA, UNSPECIFIED OTITIS MEDIA TYPE: ICD-10-CM

## 2024-12-27 RX ORDER — AMOXICILLIN 875 MG/1
TABLET, FILM COATED ORAL
Qty: 14 TABLET | Refills: 0 | Status: SHIPPED | OUTPATIENT
Start: 2024-12-27

## 2025-02-10 RX ORDER — SUMATRIPTAN SUCCINATE 100 MG/1
TABLET ORAL
Qty: 27 TABLET | Refills: 1 | Status: SHIPPED | OUTPATIENT
Start: 2025-02-10 | End: 2025-02-10 | Stop reason: SDUPTHER

## 2025-02-10 RX ORDER — SUMATRIPTAN SUCCINATE 100 MG/1
TABLET ORAL
Qty: 9 TABLET | Refills: 0 | Status: SHIPPED | OUTPATIENT
Start: 2025-02-10

## 2025-02-10 NOTE — TELEPHONE ENCOUNTER
Refill Decision Note   Shalini Stephen  is requesting a refill authorization.  Brief Assessment and Rationale for Refill:  Approve     Medication Therapy Plan:         Comments:     Note composed:9:32 AM 02/10/2025

## 2025-02-10 NOTE — TELEPHONE ENCOUNTER
No care due was identified.  [facility] Embedded Care Due Messages. Reference number: [id number].   2/10/2025 9:39:42 AM CST

## 2025-02-10 NOTE — TELEPHONE ENCOUNTER
No care due was identified.  [facility] Embedded Care Due Messages. Reference number: [id number].   2/09/2025 8:49:59 PM CST

## 2025-04-21 ENCOUNTER — HOSPITAL ENCOUNTER (EMERGENCY)
Facility: HOSPITAL | Age: 40
Discharge: HOME | End: 2025-04-21
Attending: EMERGENCY MEDICINE
Payer: COMMERCIAL

## 2025-04-21 VITALS
BODY MASS INDEX: 43.24 KG/M2 | SYSTOLIC BLOOD PRESSURE: 141 MMHG | TEMPERATURE: 98 F | OXYGEN SATURATION: 100 % | DIASTOLIC BLOOD PRESSURE: 79 MMHG | RESPIRATION RATE: 16 BRPM | WEIGHT: 235 LBS | HEIGHT: 62 IN | HEART RATE: 88 BPM

## 2025-04-21 DIAGNOSIS — S39.91XA BLUNT TRAUMA TO ABDOMEN, INITIAL ENCOUNTER: ICD-10-CM

## 2025-04-21 DIAGNOSIS — S20.219A CHEST WALL CONTUSION: ICD-10-CM

## 2025-04-21 DIAGNOSIS — T14.90XA TRAUMA: ICD-10-CM
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
## 2025-04-21 LAB
B-HCG UR QL: NEGATIVE
BACTERIA #/AREA URNS AUTO: ABNORMAL /HPF
BILIRUB UR QL STRIP.AUTO: NEGATIVE
CLARITY UR: ABNORMAL
COLOR UR AUTO: YELLOW
CREAT SERPL-MCNC: 0.7 MG/DL (ref 0.5–1.4)
CTP QC/QA: YES
GLUCOSE UR QL STRIP: NEGATIVE
HCV AB SERPL QL IA: NORMAL
HGB UR QL STRIP: NEGATIVE
HIV 1+2 AB+HIV1 P24 AG SERPL QL IA: NORMAL
KETONES UR QL STRIP: ABNORMAL
LEUKOCYTE ESTERASE UR QL STRIP: ABNORMAL
MICROSCOPIC COMMENT: ABNORMAL
NITRITE UR QL STRIP: NEGATIVE
OHS QRS DURATION: 70 MS
OHS QTC CALCULATION: 425 MS
PH UR STRIP: 5 [PH]
PROT UR QL STRIP: ABNORMAL
RBC #/AREA URNS AUTO: 5 /HPF
SAMPLE: NORMAL
SP GR UR STRIP: 1.02
SQUAMOUS #/AREA URNS AUTO: 9 /HPF
UROBILINOGEN UR STRIP-ACNC: NEGATIVE EU/DL
WBC #/AREA URNS AUTO: 39 /HPF

## 2025-04-21 PROCEDURE — 99285 EMERGENCY DEPT VISIT HI MDM: CPT | Mod: 25

## 2025-04-21 PROCEDURE — 87086 URINE CULTURE/COLONY COUNT: CPT | Performed by: EMERGENCY MEDICINE

## 2025-04-21 PROCEDURE — 86803 HEPATITIS C AB TEST: CPT | Performed by: EMERGENCY MEDICINE

## 2025-04-21 PROCEDURE — 82565 ASSAY OF CREATININE: CPT

## 2025-04-21 PROCEDURE — 81003 URINALYSIS AUTO W/O SCOPE: CPT | Performed by: EMERGENCY MEDICINE

## 2025-04-21 PROCEDURE — 81025 URINE PREGNANCY TEST: CPT | Performed by: EMERGENCY MEDICINE

## 2025-04-21 PROCEDURE — 25500020 PHARM REV CODE 255: Performed by: EMERGENCY MEDICINE

## 2025-04-21 PROCEDURE — 87389 HIV-1 AG W/HIV-1&-2 AB AG IA: CPT | Performed by: EMERGENCY MEDICINE

## 2025-04-21 PROCEDURE — 25000003 PHARM REV CODE 250: Performed by: EMERGENCY MEDICINE

## 2025-04-21 RX ORDER — HYDROCODONE BITARTRATE AND ACETAMINOPHEN 5; 325 MG/1; MG/1
1 TABLET ORAL EVERY 6 HOURS PRN
Qty: 12 TABLET | Refills: 0 | Status: SHIPPED | OUTPATIENT
Start: 2025-04-21

## 2025-04-21 RX ORDER — ACETAMINOPHEN 500 MG
1000 TABLET ORAL
Status: COMPLETED | OUTPATIENT
Start: 2025-04-21 | End: 2025-04-21

## 2025-04-21 RX ORDER — ONDANSETRON HYDROCHLORIDE 2 MG/ML
4 INJECTION, SOLUTION INTRAVENOUS
Status: DISCONTINUED | OUTPATIENT
Start: 2025-04-21 | End: 2025-04-21 | Stop reason: HOSPADM

## 2025-04-21 RX ORDER — MORPHINE SULFATE 4 MG/ML
4 INJECTION, SOLUTION INTRAMUSCULAR; INTRAVENOUS
Refills: 0 | Status: DISCONTINUED | OUTPATIENT
Start: 2025-04-21 | End: 2025-04-21 | Stop reason: HOSPADM

## 2025-04-21 RX ADMIN — ACETAMINOPHEN 1000 MG: 500 TABLET ORAL at 03:04

## 2025-04-21 RX ADMIN — IOHEXOL 100 ML: 350 INJECTION, SOLUTION INTRAVENOUS at 03:04

## 2025-04-21 NOTE — ED NOTES
Bed: [facility] 01 - C Chair  Expected date:   Expected time:   Means of arrival:   Comments:

## 2025-04-21 NOTE — Clinical Note
"Shalini"Ad Stephen was seen and treated in our emergency department on 4/21/2025.  She may return to work on 04/25/2025.  Please excuse for any work missed.     If you have any questions or concerns, please don't hesitate to call.      Humza Chatman MD"
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
## 2025-04-22 ENCOUNTER — TELEPHONE (OUTPATIENT)
Facility: OTHER | Age: 40
End: 2025-04-22
Payer: COMMERCIAL

## 2025-04-22 ENCOUNTER — TELEPHONE (OUTPATIENT)
Dept: FAMILY MEDICINE | Facility: CLINIC | Age: 40
End: 2025-04-22
Payer: COMMERCIAL

## 2025-04-22 LAB — BACTERIA UR CULT: NORMAL
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
## 2025-04-28 ENCOUNTER — OFFICE VISIT (OUTPATIENT)
Dept: FAMILY MEDICINE | Facility: CLINIC | Age: 40
End: 2025-04-28
Payer: COMMERCIAL

## 2025-04-28 VITALS
BODY MASS INDEX: 44.59 KG/M2 | WEIGHT: 242.31 LBS | HEART RATE: 90 BPM | RESPIRATION RATE: 18 BRPM | OXYGEN SATURATION: 99 % | HEIGHT: 62 IN | SYSTOLIC BLOOD PRESSURE: 122 MMHG | TEMPERATURE: 98 F | DIASTOLIC BLOOD PRESSURE: 86 MMHG
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
## 2025-04-28 DIAGNOSIS — S30.1XXA CONTUSION OF ABDOMINAL WALL, INITIAL ENCOUNTER: ICD-10-CM

## 2025-04-28 DIAGNOSIS — D64.9 ANEMIA, UNSPECIFIED TYPE: ICD-10-CM

## 2025-04-28 DIAGNOSIS — E66.813 CLASS 3 SEVERE OBESITY DUE TO EXCESS CALORIES WITH BODY MASS INDEX (BMI) OF 40.0 TO 44.9 IN ADULT, UNSPECIFIED WHETHER SERIOUS COMORBIDITY PRESENT: ICD-10-CM

## 2025-04-28 DIAGNOSIS — S20.01XA CONTUSION OF RIGHT BREAST, INITIAL ENCOUNTER: ICD-10-CM

## 2025-04-28 DIAGNOSIS — R53.83 FATIGUE, UNSPECIFIED TYPE: ICD-10-CM

## 2025-04-28 DIAGNOSIS — E66.01 CLASS 3 SEVERE OBESITY DUE TO EXCESS CALORIES WITH BODY MASS INDEX (BMI) OF 40.0 TO 44.9 IN ADULT, UNSPECIFIED WHETHER SERIOUS COMORBIDITY PRESENT: ICD-10-CM

## 2025-04-28 DIAGNOSIS — Z13.220 SCREENING FOR HYPERLIPIDEMIA: ICD-10-CM

## 2025-04-28 DIAGNOSIS — S80.12XA CONTUSION OF LEFT LOWER LEG, INITIAL ENCOUNTER: ICD-10-CM

## 2025-04-28 PROCEDURE — 99999 PR PBB SHADOW E&M-EST. PATIENT-LVL IV: CPT | Mod: PBBFAC,,,

## 2025-04-28 NOTE — LETTER
April 28, 2025      Dr. Mathis - [facility]  [address]  [address]  [facility] [place] [address]  Phone: [phone]  Fax: [phone]       Patient: Shalini Stephen   YOB: 1985  Date of Visit: 04/28/2025    To Whom It May Concern:    Silvina Stephen  was at [facility] on 04/28/2025. The patient may return to work/school on 05/06/2025 with no restrictions. If you have any questions or concerns, or if I can be of further assistance, please do not hesitate to contact me.    Sincerely,         Ashley Rojas NP

## 2025-04-29 PROBLEM — E66.01 CLASS 3 SEVERE OBESITY DUE TO EXCESS CALORIES WITH BODY MASS INDEX (BMI) OF 40.0 TO 44.9 IN ADULT: Status: ACTIVE | Noted: 2025-04-29

## 2025-04-29 PROBLEM — E66.813 CLASS 3 SEVERE OBESITY DUE TO EXCESS CALORIES WITH BODY MASS INDEX (BMI) OF 40.0 TO 44.9 IN ADULT: Status: ACTIVE | Noted: 2025-04-29

## 2025-05-02 ENCOUNTER — LAB VISIT (OUTPATIENT)
Dept: LAB | Facility: HOSPITAL | Age: 40
End: 2025-05-02
Payer: COMMERCIAL

## 2025-05-02 ENCOUNTER — PATIENT MESSAGE (OUTPATIENT)
Dept: FAMILY MEDICINE | Facility: CLINIC | Age: 40
End: 2025-05-02
Payer: COMMERCIAL

## 2025-05-02 ENCOUNTER — TELEPHONE (OUTPATIENT)
Dept: FAMILY MEDICINE | Facility: CLINIC | Age: 40
End: 2025-05-02
Payer: COMMERCIAL

## 2025-05-02 ENCOUNTER — RESULTS FOLLOW-UP (OUTPATIENT)
Dept: FAMILY MEDICINE | Facility: CLINIC | Age: 40
End: 2025-05-02

## 2025-05-02 DIAGNOSIS — E66.813 CLASS 3 SEVERE OBESITY DUE TO EXCESS CALORIES WITH BODY MASS INDEX (BMI) OF 40.0 TO 44.9 IN ADULT, UNSPECIFIED WHETHER SERIOUS COMORBIDITY PRESENT: ICD-10-CM

## 2025-05-02 DIAGNOSIS — D64.9 ANEMIA, UNSPECIFIED TYPE: ICD-10-CM

## 2025-05-02 DIAGNOSIS — Z13.220 SCREENING FOR HYPERLIPIDEMIA: ICD-10-CM

## 2025-05-02 DIAGNOSIS — E66.01 CLASS 3 SEVERE OBESITY DUE TO EXCESS CALORIES WITH BODY MASS INDEX (BMI) OF 40.0 TO 44.9 IN ADULT, UNSPECIFIED WHETHER SERIOUS COMORBIDITY PRESENT: ICD-10-CM

## 2025-05-02 DIAGNOSIS — R53.83 FATIGUE, UNSPECIFIED TYPE: ICD-10-CM
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
## 2025-05-02 DIAGNOSIS — S30.1XXA CONTUSION OF ABDOMINAL WALL, INITIAL ENCOUNTER: ICD-10-CM

## 2025-05-02 LAB
ABSOLUTE EOSINOPHIL (SMH): 0.16 K/UL
ABSOLUTE MONOCYTE (SMH): 0.56 K/UL (ref 0.3–1)
ABSOLUTE NEUTROPHIL COUNT (SMH): 4.5 K/UL (ref 1.8–7.7)
ALBUMIN SERPL-MCNC: 3.9 G/DL (ref 3.5–5.2)
ALP SERPL-CCNC: 69 UNIT/L (ref 55–135)
ALT SERPL-CCNC: 21 UNIT/L (ref 10–44)
ANION GAP (SMH): 5 MMOL/L (ref 8–16)
AST SERPL-CCNC: 15 UNIT/L (ref 10–40)
BASOPHILS # BLD AUTO: 0.03 K/UL
BASOPHILS NFR BLD AUTO: 0.4 %
BILIRUB SERPL-MCNC: 0.4 MG/DL (ref 0.1–1)
BUN SERPL-MCNC: 12 MG/DL (ref 6–20)
CALCIUM SERPL-MCNC: 8.6 MG/DL (ref 8.7–10.5)
CHLORIDE SERPL-SCNC: 104 MMOL/L (ref 95–110)
CHOLEST SERPL-MCNC: 203 MG/DL (ref 120–199)
CHOLEST/HDLC SERPL: 3 {RATIO} (ref 2–5)
CO2 SERPL-SCNC: 28 MMOL/L (ref 23–29)
CREAT SERPL-MCNC: 0.6 MG/DL (ref 0.5–1.4)
ERYTHROCYTE [DISTWIDTH] IN BLOOD BY AUTOMATED COUNT: 15.4 % (ref 11.5–14.5)
GFR SERPLBLD CREATININE-BSD FMLA CKD-EPI: >60 ML/MIN/1.73/M2
GLUCOSE SERPL-MCNC: 82 MG/DL (ref 70–110)
HCT VFR BLD AUTO: 36.5 % (ref 37–48.5)
HDLC SERPL-MCNC: 68 MG/DL (ref 40–75)
HDLC SERPL: 33.5 % (ref 20–50)
HGB BLD-MCNC: 11.6 GM/DL (ref 12–16)
IMM GRANULOCYTES # BLD AUTO: 0.03 K/UL (ref 0–0.04)
IMM GRANULOCYTES NFR BLD AUTO: 0.4 % (ref 0–0.5)
IRON SATN MFR SERPL: 10 % (ref 20–50)
IRON SERPL-MCNC: 36 UG/DL (ref 30–160)
LDLC SERPL CALC-MCNC: 123.2 MG/DL (ref 63–159)
LYMPHOCYTES # BLD AUTO: 1.97 K/UL (ref 1–4.8)
MCH RBC QN AUTO: 26.2 PG (ref 27–31)
MCHC RBC AUTO-ENTMCNC: 31.8 G/DL (ref 32–36)
MCV RBC AUTO: 83 FL (ref 82–98)
NONHDLC SERPL-MCNC: 135 MG/DL
NUCLEATED RBC (/100WBC) (SMH): 0 /100 WBC
PLATELET # BLD AUTO: 441 K/UL (ref 150–450)
PMV BLD AUTO: 9.7 FL (ref 9.2–12.9)
POTASSIUM SERPL-SCNC: 4 MMOL/L (ref 3.5–5.1)
PROT SERPL-MCNC: 7 GM/DL (ref 6–8.4)
RBC # BLD AUTO: 4.42 M/UL (ref 4–5.4)
RELATIVE EOSINOPHIL (SMH): 2.2 % (ref 0–8)
RELATIVE LYMPHOCYTE (SMH): 27.2 % (ref 18–48)
RELATIVE MONOCYTE (SMH): 7.7 % (ref 4–15)
RELATIVE NEUTROPHIL (SMH): 62.1 % (ref 38–73)
SODIUM SERPL-SCNC: 137 MMOL/L (ref 136–145)
TIBC SERPL-MCNC: 368 UG/DL (ref 250–450)
TRANSFERRIN SERPL-MCNC: 263 MG/DL (ref 200–375)
TRIGL SERPL-MCNC: 59 MG/DL (ref 30–150)
TSH SERPL-ACNC: 2.01 UIU/ML (ref 0.34–5.6)
WBC # BLD AUTO: 7.25 K/UL (ref 3.9–12.7)

## 2025-05-02 PROCEDURE — 36415 COLL VENOUS BLD VENIPUNCTURE: CPT

## 2025-05-02 PROCEDURE — 84443 ASSAY THYROID STIM HORMONE: CPT

## 2025-05-02 PROCEDURE — 80053 COMPREHEN METABOLIC PANEL: CPT

## 2025-05-02 PROCEDURE — 83036 HEMOGLOBIN GLYCOSYLATED A1C: CPT

## 2025-05-02 PROCEDURE — 85025 COMPLETE CBC W/AUTO DIFF WBC: CPT

## 2025-05-02 PROCEDURE — 83718 ASSAY OF LIPOPROTEIN: CPT

## 2025-05-02 PROCEDURE — 84466 ASSAY OF TRANSFERRIN: CPT

## 2025-05-02 NOTE — PROGRESS NOTES
Iron level is a little low.  Take 325 mg iron daily.    CBC shows stable anemia.    Thyroid is normal  Your cholesterol is a little high.  I recommend a heart healthy diet rich in fiber, fresh vegetables and fruit and low in saturated fats (fried foods, red meat, etc.).  I also recommend regular exercise

## 2025-05-02 NOTE — TELEPHONE ENCOUNTER
----- Message from Ashley Rojas NP sent at 5/2/2025  9:51 AM CDT -----  Iron level is a little low.  Take 325 mg iron daily.    CBC shows stable anemia.    Thyroid is normal  Your cholesterol is a little high.  I recommend a heart healthy diet rich in fiber, fresh vegetables and fruit and low in saturated fats (fried foods, red meat, etc.).  I also recommend regular   exercise     ----- Message -----  From: Lab, Background User  Sent: 5/2/2025   8:36 AM CDT  To: Ashley Rojas NP

## 2025-05-03 LAB
EAG (SMH): 97 MG/DL (ref 68–131)
HBA1C MFR BLD: 5 % (ref 4.5–6.2)

## 2025-05-05 ENCOUNTER — OFFICE VISIT (OUTPATIENT)
Dept: FAMILY MEDICINE | Facility: CLINIC | Age: 40
End: 2025-05-05
Payer: COMMERCIAL

## 2025-05-05 VITALS
BODY MASS INDEX: 44.85 KG/M2 | TEMPERATURE: 97 F | WEIGHT: 243.75 LBS | OXYGEN SATURATION: 100 % | HEIGHT: 62 IN | HEART RATE: 97 BPM | DIASTOLIC BLOOD PRESSURE: 80 MMHG | RESPIRATION RATE: 18 BRPM | SYSTOLIC BLOOD PRESSURE: 122 MMHG
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
## 2025-05-05 DIAGNOSIS — S20.01XA CONTUSION OF RIGHT BREAST, INITIAL ENCOUNTER: ICD-10-CM

## 2025-05-05 DIAGNOSIS — S30.1XXA CONTUSION OF ABDOMINAL WALL, INITIAL ENCOUNTER: ICD-10-CM

## 2025-05-05 PROCEDURE — 99999 PR PBB SHADOW E&M-EST. PATIENT-LVL IV: CPT | Mod: PBBFAC,,,

## 2025-05-08 DIAGNOSIS — R22.2 LUMP IN CHEST: Primary | ICD-10-CM

## 2025-05-08 NOTE — TELEPHONE ENCOUNTER
We can order a soft tissue ultrasound.  Please ask her if this is the pain she was having in her right upper chest.

## 2025-05-09 ENCOUNTER — RESULTS FOLLOW-UP (OUTPATIENT)
Dept: FAMILY MEDICINE | Facility: CLINIC | Age: 40
End: 2025-05-09

## 2025-05-09 ENCOUNTER — HOSPITAL ENCOUNTER (OUTPATIENT)
Dept: RADIOLOGY | Facility: HOSPITAL | Age: 40
Discharge: HOME | End: 2025-05-09
Payer: COMMERCIAL

## 2025-05-09 DIAGNOSIS — R22.2 LUMP IN CHEST: ICD-10-CM

## 2025-05-09 PROCEDURE — 76604 US EXAM CHEST: CPT | Mod: 26,,, | Performed by: RADIOLOGY

## 2025-05-09 PROCEDURE — 76604 US EXAM CHEST: CPT | Mod: TC
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
